# Patient Record
Sex: FEMALE | Race: WHITE | NOT HISPANIC OR LATINO | Employment: OTHER | URBAN - METROPOLITAN AREA
[De-identification: names, ages, dates, MRNs, and addresses within clinical notes are randomized per-mention and may not be internally consistent; named-entity substitution may affect disease eponyms.]

---

## 2017-03-31 ENCOUNTER — TRANSCRIBE ORDERS (OUTPATIENT)
Dept: ADMINISTRATIVE | Facility: HOSPITAL | Age: 57
End: 2017-03-31

## 2017-03-31 DIAGNOSIS — Z12.31 VISIT FOR SCREENING MAMMOGRAM: Primary | ICD-10-CM

## 2017-06-06 ENCOUNTER — APPOINTMENT (OUTPATIENT)
Dept: MAMMOGRAPHY | Facility: HOSPITAL | Age: 57
End: 2017-06-06

## 2017-07-17 ENCOUNTER — HOSPITAL ENCOUNTER (OUTPATIENT)
Dept: MAMMOGRAPHY | Facility: HOSPITAL | Age: 57
Discharge: HOME | End: 2017-07-17
Admitting: PHYSICIAN ASSISTANT

## 2017-07-17 DIAGNOSIS — Z12.31 VISIT FOR SCREENING MAMMOGRAM: ICD-10-CM

## 2017-07-17 PROCEDURE — 77063 BREAST TOMOSYNTHESIS BI: CPT

## 2017-07-17 PROCEDURE — G0202 SCR MAMMO BI INCL CAD: HCPCS

## 2017-07-17 PROCEDURE — 77067 SCR MAMMO BI INCL CAD: CPT | Performed by: RADIOLOGY

## 2017-07-17 PROCEDURE — 77063 BREAST TOMOSYNTHESIS BI: CPT | Performed by: RADIOLOGY

## 2017-07-31 ENCOUNTER — HOSPITAL ENCOUNTER (OUTPATIENT)
Dept: MAMMOGRAPHY | Facility: HOSPITAL | Age: 57
Discharge: HOME | End: 2017-07-31
Admitting: PHYSICIAN ASSISTANT

## 2017-07-31 DIAGNOSIS — R92.8 ABNORMAL MAMMOGRAM: ICD-10-CM

## 2017-07-31 PROCEDURE — 77065 DX MAMMO INCL CAD UNI: CPT | Performed by: RADIOLOGY

## 2017-07-31 PROCEDURE — G0279 TOMOSYNTHESIS, MAMMO: HCPCS

## 2017-07-31 PROCEDURE — G0206 DX MAMMO INCL CAD UNI: HCPCS

## 2017-07-31 PROCEDURE — 77061 BREAST TOMOSYNTHESIS UNI: CPT | Performed by: RADIOLOGY

## 2018-06-20 ENCOUNTER — TRANSCRIBE ORDERS (OUTPATIENT)
Dept: ADMINISTRATIVE | Facility: HOSPITAL | Age: 58
End: 2018-06-20

## 2018-06-20 DIAGNOSIS — Z12.31 VISIT FOR SCREENING MAMMOGRAM: Primary | ICD-10-CM

## 2018-07-19 ENCOUNTER — HOSPITAL ENCOUNTER (OUTPATIENT)
Dept: MAMMOGRAPHY | Facility: HOSPITAL | Age: 58
Discharge: HOME | End: 2018-07-19
Admitting: PHYSICIAN ASSISTANT

## 2018-07-19 DIAGNOSIS — Z12.31 VISIT FOR SCREENING MAMMOGRAM: ICD-10-CM

## 2018-07-19 PROCEDURE — 77067 SCR MAMMO BI INCL CAD: CPT | Performed by: RADIOLOGY

## 2018-07-19 PROCEDURE — 77067 SCR MAMMO BI INCL CAD: CPT

## 2018-07-19 PROCEDURE — 77063 BREAST TOMOSYNTHESIS BI: CPT

## 2018-07-19 PROCEDURE — 77063 BREAST TOMOSYNTHESIS BI: CPT | Performed by: RADIOLOGY

## 2019-06-17 ENCOUNTER — TRANSCRIBE ORDERS (OUTPATIENT)
Dept: ADMINISTRATIVE | Facility: HOSPITAL | Age: 59
End: 2019-06-17

## 2019-06-17 DIAGNOSIS — Z12.31 VISIT FOR SCREENING MAMMOGRAM: Primary | ICD-10-CM

## 2019-08-29 ENCOUNTER — APPOINTMENT (OUTPATIENT)
Dept: MAMMOGRAPHY | Facility: HOSPITAL | Age: 59
End: 2019-08-29

## 2019-09-18 ENCOUNTER — APPOINTMENT (OUTPATIENT)
Dept: MAMMOGRAPHY | Facility: HOSPITAL | Age: 59
End: 2019-09-18

## 2019-10-09 ENCOUNTER — HOSPITAL ENCOUNTER (OUTPATIENT)
Dept: MAMMOGRAPHY | Facility: HOSPITAL | Age: 59
Discharge: HOME | End: 2019-10-09
Admitting: PHYSICIAN ASSISTANT

## 2019-10-09 DIAGNOSIS — Z12.31 VISIT FOR SCREENING MAMMOGRAM: ICD-10-CM

## 2019-10-09 PROCEDURE — 77067 SCR MAMMO BI INCL CAD: CPT

## 2019-10-09 PROCEDURE — 77067 SCR MAMMO BI INCL CAD: CPT | Performed by: RADIOLOGY

## 2019-10-09 PROCEDURE — 77063 BREAST TOMOSYNTHESIS BI: CPT | Performed by: RADIOLOGY

## 2019-10-09 PROCEDURE — 77063 BREAST TOMOSYNTHESIS BI: CPT

## 2020-06-04 ENCOUNTER — TRANSCRIBE ORDERS (OUTPATIENT)
Dept: ADMINISTRATIVE | Facility: HOSPITAL | Age: 60
End: 2020-06-04

## 2020-06-04 DIAGNOSIS — Z12.31 VISIT FOR SCREENING MAMMOGRAM: Primary | ICD-10-CM

## 2020-10-12 ENCOUNTER — APPOINTMENT (OUTPATIENT)
Dept: MAMMOGRAPHY | Facility: HOSPITAL | Age: 60
End: 2020-10-12

## 2021-01-05 ENCOUNTER — HOSPITAL ENCOUNTER (OUTPATIENT)
Dept: MAMMOGRAPHY | Facility: HOSPITAL | Age: 61
Discharge: HOME | End: 2021-01-05
Admitting: FAMILY MEDICINE

## 2021-01-05 DIAGNOSIS — Z12.31 VISIT FOR SCREENING MAMMOGRAM: ICD-10-CM

## 2021-01-05 PROCEDURE — 77063 BREAST TOMOSYNTHESIS BI: CPT | Performed by: RADIOLOGY

## 2021-01-05 PROCEDURE — 77067 SCR MAMMO BI INCL CAD: CPT

## 2021-01-05 PROCEDURE — 77067 SCR MAMMO BI INCL CAD: CPT | Performed by: RADIOLOGY

## 2021-01-05 PROCEDURE — 77063 BREAST TOMOSYNTHESIS BI: CPT

## 2021-01-27 ENCOUNTER — IMMUNIZATION (OUTPATIENT)
Dept: VACCINE CLINIC | Facility: HOSPITAL | Age: 61
End: 2021-01-27

## 2021-01-27 PROCEDURE — 0001A: CPT | Performed by: INTERNAL MEDICINE

## 2021-01-27 PROCEDURE — 91300 HC SARSCOV02 VAC 30MCG/0.3ML IM: CPT | Performed by: INTERNAL MEDICINE

## 2021-02-17 ENCOUNTER — IMMUNIZATION (OUTPATIENT)
Dept: VACCINE CLINIC | Facility: HOSPITAL | Age: 61
End: 2021-02-17

## 2021-02-17 PROCEDURE — 0002A: CPT | Performed by: INTERNAL MEDICINE

## 2021-02-17 PROCEDURE — 91300 HC SARSCOV02 VAC 30MCG/0.3ML IM: CPT | Performed by: INTERNAL MEDICINE

## 2021-08-06 ENCOUNTER — TRANSCRIBE ORDERS (OUTPATIENT)
Dept: ADMINISTRATIVE | Facility: HOSPITAL | Age: 61
End: 2021-08-06

## 2021-08-06 DIAGNOSIS — Z12.31 VISIT FOR SCREENING MAMMOGRAM: Primary | ICD-10-CM

## 2021-12-14 ENCOUNTER — OFFICE VISIT (OUTPATIENT)
Dept: ORTHOPEDIC SURGERY | Facility: CLINIC | Age: 61
End: 2021-12-14

## 2021-12-14 VITALS
BODY MASS INDEX: 28.41 KG/M2 | DIASTOLIC BLOOD PRESSURE: 84 MMHG | HEIGHT: 62 IN | HEART RATE: 77 BPM | SYSTOLIC BLOOD PRESSURE: 130 MMHG | WEIGHT: 154.4 LBS

## 2021-12-14 DIAGNOSIS — M79.672 LEFT FOOT PAIN: Primary | ICD-10-CM

## 2021-12-14 DIAGNOSIS — M25.562 LEFT KNEE PAIN, UNSPECIFIED CHRONICITY: ICD-10-CM

## 2021-12-14 DIAGNOSIS — M17.12 PRIMARY OSTEOARTHRITIS OF LEFT KNEE: ICD-10-CM

## 2021-12-14 DIAGNOSIS — S93.602A FOOT SPRAIN, LEFT, INITIAL ENCOUNTER: ICD-10-CM

## 2021-12-14 PROCEDURE — 99204 OFFICE O/P NEW MOD 45 MIN: CPT | Performed by: PHYSICIAN ASSISTANT

## 2021-12-14 PROCEDURE — 20610 DRAIN/INJ JOINT/BURSA W/O US: CPT | Performed by: PHYSICIAN ASSISTANT

## 2021-12-14 RX ORDER — LIRAGLUTIDE 6 MG/ML
INJECTION, SOLUTION SUBCUTANEOUS DAILY
COMMUNITY
Start: 2021-11-18

## 2021-12-14 RX ORDER — CITALOPRAM 20 MG/1
TABLET ORAL
COMMUNITY
Start: 2021-12-01

## 2021-12-14 RX ORDER — ZOLPIDEM TARTRATE 5 MG/1
TABLET ORAL
COMMUNITY
Start: 2021-11-04 | End: 2022-07-13

## 2021-12-14 RX ADMIN — LIDOCAINE HYDROCHLORIDE 4 ML: 10 INJECTION, SOLUTION EPIDURAL; INFILTRATION; INTRACAUDAL; PERINEURAL at 15:23

## 2021-12-14 RX ADMIN — TRIAMCINOLONE ACETONIDE 40 MG: 40 INJECTION, SUSPENSION INTRA-ARTICULAR; INTRAMUSCULAR at 15:23

## 2021-12-14 NOTE — PROGRESS NOTES
[facility] [place] Orthopaedic Surgery Clinic Note        Subjective     Pain of the Left Knee and Pain of the Left Foot      HPI    Marcia Hussein is a 61 y.o. female.  This is a very pleasant patient presenting to discuss her left foot and knee pain since a fall on 2021.  She reports she fell down some stairs and hyperflexed her knee and with plantarflexion of the foot.  She had swelling in the knee and pain in the knee.  She had dorsal foot pain at that time as well.  Both have improved.  The knee is more painful than the foot.  She has pain 6-10 is aching and throbbing.  Popping grinding and stiffness.  Worse with climbing stairs.  She has pain with lying with her knees together.  She is treated with anti-inflammatories.  Here for further evaluation treat recommendations.    Past Medical History:   Diagnosis Date   • Anxiety       Past Surgical History:   Procedure Laterality Date   • ELBOW PROCEDURE Left     orif   • SHOULDER SURGERY Right     froxen shoulder      Family History   Problem Relation Age of Onset   • Breast cancer Sister 40   • No Known Problems Mother    • Diabetes Father    • Hypertension Father    • Ovarian cancer Neg Hx      Social History     Socioeconomic History   • Marital status:    Tobacco Use   • Smoking status: Former Smoker     Types: Cigarettes     Start date:      Quit date:      Years since quittin.9   • Smokeless tobacco: Never Used   Substance and Sexual Activity   • Alcohol use: Yes   • Drug use: Never   • Sexual activity: Defer      Current Outpatient Medications on File Prior to Visit   Medication Sig Dispense Refill   • citalopram (CeleXA) 20 MG tablet      • Saxenda 18 MG/3ML injection pen      • zolpidem (AMBIEN) 5 MG tablet        No current facility-administered medications on file prior to visit.      No Known Allergies       Review of Systems   Constitutional: Negative.    HENT: Negative.    Eyes: Negative.    Respiratory: Negative.   "  Cardiovascular: Negative.    Gastrointestinal: Negative.    Endocrine: Negative.    Genitourinary: Negative.    Musculoskeletal: Positive for arthralgias.   Skin: Negative.    Allergic/Immunologic: Negative.    Neurological: Negative.    Hematological: Negative.    Psychiatric/Behavioral: Negative.         I reviewed the patient's chief complaint, history of present illness, review of systems, past medical history, surgical history, family history, social history, medications and allergy list.        Objective      Physical Exam  /84   Pulse 77   Ht 158.1 cm (62.25\")   Wt 70 kg (154 lb 6.4 oz)   BMI 28.01 kg/m²     Body mass index is 28.01 kg/m².    General  Mental Status - alert  General Appearance - cooperative, well groomed, not in acute distress  Orientation - Oriented X3  Build & Nutrition - well developed and well nourished  Posture - normal posture  Gait - normal       Ortho Exam  Left knee exam: Tender palpation of the medial patellofemoral joint line.  Range of motion 0-1 20 ligament stable valgus varus stress Novastan intact distally.    Left foot exam: Mildly tender over the midfoot.  No swelling warmth or erythema.  No specific Lisfranc tenderness.  Neurovascular intact distally.  Pulses 2+.      Assessment    Assessment:  1. Left foot pain    2. Left knee pain, unspecified chronicity    3. Primary osteoarthritis of left knee    4. Foot sprain, left, initial encounter          Plan:  1. Recommend over-the-counter medication as needed for discomfort  2. Left knee arthritis with flare.  I reviewed today's knee x-rays clinical findings past and current treatment the patient.  X-rays show moderate to severe tricompartmental arthritis with genu varum alignment with bone-on-bone articulation medial compartment.  Large periarticular osteophytes visualized in all compartments.  No acute bony injury or fracture.  I think her pain functional rotations are secondary to a flare from the fall.  We " discussed treatment options including good shoe wear, ice, NSAIDs, low impact exercise, knee/hip strengthening, weight loss and the 4 fold multiplier on the joint, steroid injection, viscosupplementation and eventually knee replacement.  Plan today is cortisone injection into the left knee.  I will see her back in 3 months see how she is progressed or sooner if needed.  3. Left foot pain.  I reviewed today's x-rays clinical findings past and current treatment patient.  X-rays show mild midfoot arthritis with no acute bony findings.  I reassured her there is nothing more worrisome going on.  She despond to make sure there was nothing fractured.  I will see her back as needed.    I discussed with the patient the potential benefits of performing a therapeutic injection of the left knee as well as potential risks including but not limited to infection, swelling, pain, bleeding, bruising, nerve/vessel damage, skin color changes, transient elevation in blood glucose levels, and fat atrophy. After informed consent and verifying correct patient, procedure site, and type of procedure, the area was prepped with Hibiclens, ethyl chloride was used to numb the skin. Via the inferior lateral approach, 4cc of 1% lidocaine and  40mg/ml of Kenalog were injected into the left knee. The patient tolerated the procedure well. There were no complications.     History, diagnosis and treatment plan discussed with Dr. Miles.            Nette Jimenez PA-C  12/16/21  15:09 EST

## 2021-12-14 NOTE — PROGRESS NOTES
Procedure   Large Joint Arthrocentesis: L knee  Date/Time: 12/14/2021 3:23 PM  Consent given by: patient  Site marked: site marked  Timeout: Immediately prior to procedure a time out was called to verify the correct patient, procedure, equipment, support staff and site/side marked as required   Supporting Documentation  Indications: pain   Procedure Details  Location: knee - L knee  Preparation: Patient was prepped and draped in the usual sterile fashion  Needle size: 22 G  Approach: anterolateral  Medications administered: 4 mL lidocaine PF 1% 1 %; 40 mg triamcinolone acetonide 40 MG/ML  Patient tolerance: patient tolerated the procedure well with no immediate complications

## 2021-12-16 RX ORDER — LIDOCAINE HYDROCHLORIDE 10 MG/ML
4 INJECTION, SOLUTION EPIDURAL; INFILTRATION; INTRACAUDAL; PERINEURAL
Status: COMPLETED | OUTPATIENT
Start: 2021-12-14 | End: 2021-12-14

## 2021-12-16 RX ORDER — TRIAMCINOLONE ACETONIDE 40 MG/ML
40 INJECTION, SUSPENSION INTRA-ARTICULAR; INTRAMUSCULAR
Status: COMPLETED | OUTPATIENT
Start: 2021-12-14 | End: 2021-12-14

## 2021-12-23 ENCOUNTER — TELEPHONE (OUTPATIENT)
Dept: ORTHOPEDIC SURGERY | Facility: CLINIC | Age: 61
End: 2021-12-23

## 2021-12-23 RX ORDER — IBUPROFEN 600 MG/1
600 TABLET ORAL EVERY 6 HOURS PRN
Qty: 30 TABLET | Refills: 0 | Status: SHIPPED | OUTPATIENT
Start: 2021-12-23

## 2021-12-23 NOTE — TELEPHONE ENCOUNTER
From Tatum:   Let patient know medication was placed any further refills will need to come through PCP or she can get over-the-counter.     I spoke with the patient and gave her the above information. She had no further questions.    Esperanza Jimenez

## 2021-12-23 NOTE — TELEPHONE ENCOUNTER
PATIENT CALLED STATING THAT AT LAST VISIT PROVIDER TALKED ABOUT CALLING IN ADVIL, PATIENT WOULD LIKE RX CALLED INTO PHARMACY ON FILE.

## 2021-12-27 ENCOUNTER — TELEPHONE (OUTPATIENT)
Dept: ORTHOPEDIC SURGERY | Facility: CLINIC | Age: 61
End: 2021-12-27

## 2021-12-27 RX ORDER — IBUPROFEN 600 MG/1
600 TABLET ORAL EVERY 8 HOURS PRN
Qty: 30 TABLET | Refills: 1 | Status: SHIPPED | OUTPATIENT
Start: 2021-12-27 | End: 2023-03-14

## 2021-12-27 NOTE — TELEPHONE ENCOUNTER
Advil is an over-the-counter medication.  If she would like prescription strength of ibuprofen then I can put this in for her.    Message text      I called and let her know and she does want a prescription.  Could you put that in please?  Thanks.  Anjana

## 2021-12-27 NOTE — TELEPHONE ENCOUNTER
Patient would like to up the dosage on her advil so she does not need to take it three times a day. Says she requested last week would like a call to be notified if dosage could be increased.

## 2021-12-27 NOTE — TELEPHONE ENCOUNTER
Pt called back to state that [facility] said they did not receive the RX request.    I called [facility] just to confirm and no RX was received. Pharmacy stated to make sure we are sending the RX to [address] [place] [address]

## 2021-12-27 NOTE — TELEPHONE ENCOUNTER
Nette Jimenez PA-C ordered 600 mg ibuprofen last week on 12/23/2021.  Further refills will need to go through her PCP after she picks up this prescription.    Message text      I called and let her know.  Her prescription was sent to the wrong pharmacy.  She is going to she if she can get it straighten out if not she will call back.  Anjana

## 2022-01-07 ENCOUNTER — HOSPITAL ENCOUNTER (OUTPATIENT)
Dept: MAMMOGRAPHY | Facility: HOSPITAL | Age: 62
Discharge: HOME | End: 2022-01-07
Admitting: FAMILY MEDICINE

## 2022-01-07 DIAGNOSIS — Z12.31 VISIT FOR SCREENING MAMMOGRAM: ICD-10-CM

## 2022-01-07 PROCEDURE — 77063 BREAST TOMOSYNTHESIS BI: CPT | Performed by: RADIOLOGY

## 2022-01-07 PROCEDURE — 77063 BREAST TOMOSYNTHESIS BI: CPT

## 2022-01-07 PROCEDURE — 77067 SCR MAMMO BI INCL CAD: CPT

## 2022-01-07 PROCEDURE — 77067 SCR MAMMO BI INCL CAD: CPT | Performed by: RADIOLOGY

## 2022-03-15 ENCOUNTER — OFFICE VISIT (OUTPATIENT)
Dept: ORTHOPEDIC SURGERY | Facility: CLINIC | Age: 62
End: 2022-03-15

## 2022-03-15 VITALS — SYSTOLIC BLOOD PRESSURE: 132 MMHG | HEIGHT: 62 IN | DIASTOLIC BLOOD PRESSURE: 92 MMHG | BODY MASS INDEX: 28.34 KG/M2

## 2022-03-15 DIAGNOSIS — M17.12 PRIMARY OSTEOARTHRITIS OF LEFT KNEE: Primary | ICD-10-CM

## 2022-03-15 PROCEDURE — 99213 OFFICE O/P EST LOW 20 MIN: CPT | Performed by: PHYSICIAN ASSISTANT

## 2022-03-15 NOTE — PROGRESS NOTES
"        [facility] – [place] [place] Orthopaedic Surgery Clinic Note        Subjective     CC: Follow-up (3 month f/u--Primary osteoarthritis of left knee )      HPI    Marcia Hussein is a 61 y.o. female. Patient returns today for her left knee.  She reports signficantly improved pain with injection.  She is having occasional twinges, but overall doing much better.  She does not feel she needs any further treatment     Overall, patient's symptoms are improved    ROS:    Constiutional:Pt denies fever, chills, nausea, or vomiting.  MSK:as above        Objective      Past Medical History  Past Medical History:   Diagnosis Date   • Anxiety          Physical Exam  /92   Ht 157.5 cm (62.01\")   BMI 28.34 kg/m²     Body mass index is 28.34 kg/m².    Patient is well nourished and well developed.        Ortho Exam  Left knee exam: nontender to palpation.  ROM 0-130.  Ligaments stable to valgus and varus stress.  Neurovascular intact distally.  Normal gait.    Imaging/Labs/EMG Reviewed:  Imaging Results (Last 24 Hours)     ** No results found for the last 24 hours. **            Assessment    Assessment:  1. Primary osteoarthritis of left knee        Plan:  1. Recommend over the counter anti-inflammatories for pain and/or swelling  2. Left knee arthritis with improved pain with cortisone injection.  Patient does not feel she needs any further treatment at this time.  We discussed further treatment including repeat cortisone injection versus viscosupplementation in the future.  At this point, she will continue with her normal activity and return to see me as needed.      Nette Jimenez PA-C  03/16/22  13:18 EDT      "

## 2022-04-05 ENCOUNTER — TELEPHONE (OUTPATIENT)
Dept: ORTHOPEDIC SURGERY | Facility: CLINIC | Age: 62
End: 2022-04-05

## 2022-04-05 NOTE — TELEPHONE ENCOUNTER
Caller: REZA MAXWELL    Relationship to patient: SELF    Best call back number:      Type of visit: CORTISONE INJECTION    Requested date: ASAP    If rescheduling, when is the original appointment: 4/14/22

## 2022-04-12 ENCOUNTER — OFFICE VISIT (OUTPATIENT)
Dept: ORTHOPEDIC SURGERY | Facility: CLINIC | Age: 62
End: 2022-04-12

## 2022-04-12 VITALS
HEIGHT: 62 IN | DIASTOLIC BLOOD PRESSURE: 86 MMHG | BODY MASS INDEX: 30 KG/M2 | WEIGHT: 163 LBS | SYSTOLIC BLOOD PRESSURE: 122 MMHG

## 2022-04-12 DIAGNOSIS — M17.12 PRIMARY OSTEOARTHRITIS OF LEFT KNEE: Primary | ICD-10-CM

## 2022-04-12 PROCEDURE — 20610 DRAIN/INJ JOINT/BURSA W/O US: CPT | Performed by: PHYSICIAN ASSISTANT

## 2022-04-12 RX ADMIN — LIDOCAINE HYDROCHLORIDE 4 ML: 10 INJECTION, SOLUTION EPIDURAL; INFILTRATION; INTRACAUDAL; PERINEURAL at 15:36

## 2022-04-12 RX ADMIN — TRIAMCINOLONE ACETONIDE 40 MG: 40 INJECTION, SUSPENSION INTRA-ARTICULAR; INTRAMUSCULAR at 15:36

## 2022-04-12 NOTE — PROGRESS NOTES
Procedure   - Large Joint Arthrocentesis: L knee on 4/12/2022 3:36 PM  Indications: pain  Details: 22 G needle, anterolateral approach  Medications: 4 mL lidocaine PF 1% 1 %; 40 mg triamcinolone acetonide 40 MG/ML  Outcome: tolerated well, no immediate complications  Procedure, treatment alternatives, risks and benefits explained, specific risks discussed. Consent was given by the patient. Immediately prior to procedure a time out was called to verify the correct patient, procedure, equipment, support staff and site/side marked as required. Patient was prepped and draped in the usual sterile fashion.

## 2022-04-12 NOTE — PROGRESS NOTES
"    [facility] – [place] Orthopaedic Surgery Clinic Note        Subjective     CC: Follow-up of the Left Knee (4 week follow up; Primary osteoarthritis of left knee-last cortisone injection given on 12/14/21)      BROOK Hussein is a 61 y.o. female.  Established patient (new to me) returns requesting corticosteroid injection to her left knee.  Previously seen and treated by Nette Jimenez PA-C with a corticosteroid injection 12/14/2021.  She had a follow-up appointment 3/15/2022 and at that time her knee was doing okay and she did not feel that she needed any further treatment then.  But over the last couple weeks pain in her knee has been slowly increasing.    Pain scale 6/10.  Patient notes grinding and stiffness to the knee.  Pain is worse with climbing stairs and sleeping.  She has been icing and elevating but it is not helping.  No reported numbness or tingling.    Overall, patient's symptoms are worsening.    ROS:    Constiutional:Pt denies fever, chills, nausea, or vomiting.  MSK:as above        Objective      Past Medical History  Past Medical History:   Diagnosis Date   • Anxiety          Physical Exam  /86   Ht 157.5 cm (62.01\")   Wt 73.9 kg (163 lb)   BMI 29.81 kg/m²     Body mass index is 29.81 kg/m².    Patient is well nourished and well developed.        Ortho Exam  Left knee  Skin: Grossly intact without any redness or warmth.  Trace effusion noted.  Tenderness: Positive tenderness noted predominantly in medial joint line and thais-/retropatellar.  Motion: 0-120 degrees with crepitus.  Testing: Varus and valgus stress test negative.  Motor/sensory: Grossly intact L2-S1.      Imaging/Labs/EMG Reviewed:  No new imaging today.      Assessment:  1. Primary osteoarthritis of left knee        Plan:  1. Left knee osteoarthritis worsening pain--offered accepted corticosteroid injection to the left knee.  Injection was given today.  2. Continue with over-the-counter pain medication as needed.  3. She may " follow-up in 4 months for repeat corticosteroid injection.  4. Questions and concerns answered.    After discussing the risks, benefits, indications of injection, the patient gave consent to proceed.  Her left knee was confirmed as the correct joint to be injected with a timeout.  It was then prepped using Hibiclens and injected with a mixture of 4 cc of 1% plain lidocaine and 1 cc of Kenalog (40 mg per mL), without any resistance through the anterior lateral approach, patient in seated position.  Area was cleaned, hemostasis was achieved and a Band-Aid was applied over the injection site.  The patient tolerated procedure well.  I instructed the patient on signs and symptoms of infection.  They should report to the emergency department or return to clinic if any of these develop, for further evaluation and treatment.  Recommended modifying activity for the next 48 hours to include rest, ice, elevation and oral pain medication as needed.        Tatum Schaeffer PA-C  04/15/22  13:31 EDT      Dictated Utilizing Dragon Dictation.

## 2022-04-15 RX ORDER — LIDOCAINE HYDROCHLORIDE 10 MG/ML
4 INJECTION, SOLUTION EPIDURAL; INFILTRATION; INTRACAUDAL; PERINEURAL
Status: COMPLETED | OUTPATIENT
Start: 2022-04-12 | End: 2022-04-12

## 2022-04-15 RX ORDER — TRIAMCINOLONE ACETONIDE 40 MG/ML
40 INJECTION, SUSPENSION INTRA-ARTICULAR; INTRAMUSCULAR
Status: COMPLETED | OUTPATIENT
Start: 2022-04-12 | End: 2022-04-12

## 2022-06-09 ENCOUNTER — TELEPHONE (OUTPATIENT)
Dept: ORTHOPEDIC SURGERY | Facility: CLINIC | Age: 62
End: 2022-06-09

## 2022-06-09 NOTE — TELEPHONE ENCOUNTER
Caller: REZA MAXWELL    Relationship to patient: PATIENT     Best call back number:[phone]    Chief complaint: LEFT KNEE PAIN     Type of visit: INJECTION     Requested date: MID July       Additional notes: LAST INJECTION 04/12/22

## 2022-07-13 ENCOUNTER — OFFICE VISIT (OUTPATIENT)
Dept: ORTHOPEDIC SURGERY | Facility: CLINIC | Age: 62
End: 2022-07-13

## 2022-07-13 VITALS
WEIGHT: 162 LBS | BODY MASS INDEX: 29.81 KG/M2 | HEIGHT: 62 IN | SYSTOLIC BLOOD PRESSURE: 126 MMHG | DIASTOLIC BLOOD PRESSURE: 88 MMHG

## 2022-07-13 DIAGNOSIS — M17.12 PRIMARY OSTEOARTHRITIS OF LEFT KNEE: Primary | ICD-10-CM

## 2022-07-13 PROCEDURE — 20610 DRAIN/INJ JOINT/BURSA W/O US: CPT | Performed by: PHYSICIAN ASSISTANT

## 2022-07-13 RX ORDER — ZOLPIDEM TARTRATE 10 MG/1
0.5 TABLET ORAL NIGHTLY
COMMUNITY
Start: 2022-06-08

## 2022-07-13 RX ORDER — ACYCLOVIR 400 MG/1
TABLET ORAL AS NEEDED
COMMUNITY
Start: 2022-05-10

## 2022-07-13 RX ORDER — LOSARTAN POTASSIUM 25 MG/1
1 TABLET ORAL DAILY
COMMUNITY
Start: 2022-07-05

## 2022-07-13 RX ADMIN — LIDOCAINE HYDROCHLORIDE 4 ML: 10 INJECTION, SOLUTION EPIDURAL; INFILTRATION; INTRACAUDAL; PERINEURAL at 11:50

## 2022-07-13 RX ADMIN — TRIAMCINOLONE ACETONIDE 40 MG: 40 INJECTION, SUSPENSION INTRA-ARTICULAR; INTRAMUSCULAR at 11:50

## 2022-07-13 NOTE — PROGRESS NOTES
"        [facility] [place] [facility]opa[facility] Surgery Clinic Note        Subjective     CC: Follow-up (3 month follow up; Primary osteoarthritis of left knee-last cortisone injection given on 4/12/22)      BROOK Hussein is a 61 y.o. female. Patient returns for her left knee.  She is having worsening pain.  Not interested in surgery.     Overall, patient's symptoms are worsening.    ROS:    Constiutional:Pt denies fever, chills, nausea, or vomiting.  MSK:as above        Objective      Past Medical History  Past Medical History:   Diagnosis Date   • Anxiety          Physical Exam  /88   Ht 157.5 cm (62.01\")   Wt 73.5 kg (162 lb)   BMI 29.62 kg/m²     Body mass index is 29.62 kg/m².    Patient is well nourished and well developed.        Ortho Exam  Left knee exam:  Mildly tender ove rthe medial joint line.  Normal ROM. NVI distally    Imaging/Labs/EMG Reviewed:  Imaging Results (Last 24 Hours)     ** No results found for the last 24 hours. **            Assessment    Assessment:  1. Primary osteoarthritis of left knee        Plan:  1. Recommend over the counter anti-inflammatories for pain and/or swelling  2. Left knee arthritis.  Plan today is steroid injection into the left knee.  RTC prn.    I discussed with the patient the potential benefits of performing a therapeutic injection of the left knee as well as potential risks including but not limited to infection, swelling, pain, bleeding, bruising, nerve/vessel damage, skin color changes, transient elevation in blood glucose levels, and fat atrophy. After informed consent and verifying correct patient, procedure site, and type of procedure, the area was prepped with Hibiclens, ethyl chloride was used to numb the skin. Via the inferior lateral approach, 4cc of 1% lidocaine and  40mg/ml of Kenalog were injected into the left knee. The patient tolerated the procedure well. There were no complications.         Nette Jimenez PA-C  07/18/22  14:23 EDT      "

## 2022-07-13 NOTE — PROGRESS NOTES
Procedure   - Large Joint Arthrocentesis: L knee on 7/13/2022 11:50 AM  Indications: pain  Details: 22 G needle, anterolateral approach  Medications: 4 mL lidocaine PF 1% 1 %; 40 mg triamcinolone acetonide 40 MG/ML  Outcome: tolerated well, no immediate complications  Procedure, treatment alternatives, risks and benefits explained, specific risks discussed. Consent was given by the patient. Immediately prior to procedure a time out was called to verify the correct patient, procedure, equipment, support staff and site/side marked as required. Patient was prepped and draped in the usual sterile fashion.

## 2022-07-18 RX ORDER — LIDOCAINE HYDROCHLORIDE 10 MG/ML
4 INJECTION, SOLUTION EPIDURAL; INFILTRATION; INTRACAUDAL; PERINEURAL
Status: COMPLETED | OUTPATIENT
Start: 2022-07-13 | End: 2022-07-13

## 2022-07-18 RX ORDER — TRIAMCINOLONE ACETONIDE 40 MG/ML
40 INJECTION, SUSPENSION INTRA-ARTICULAR; INTRAMUSCULAR
Status: COMPLETED | OUTPATIENT
Start: 2022-07-13 | End: 2022-07-13

## 2022-09-16 ENCOUNTER — TELEPHONE (OUTPATIENT)
Dept: ORTHOPEDIC SURGERY | Facility: CLINIC | Age: 62
End: 2022-09-16

## 2022-09-16 NOTE — TELEPHONE ENCOUNTER
Caller: REZA MAXWELL    Relationship to patient: SELF    Best call back number: [phone]    Chief complaint: LEFT KNEE PAIN    Type of visit: STEROID INJ    Requested date: AS SOON AS RAFITA

## 2022-10-12 ENCOUNTER — OFFICE VISIT (OUTPATIENT)
Dept: ORTHOPEDIC SURGERY | Facility: CLINIC | Age: 62
End: 2022-10-12

## 2022-10-12 VITALS
HEIGHT: 62 IN | WEIGHT: 156 LBS | SYSTOLIC BLOOD PRESSURE: 126 MMHG | BODY MASS INDEX: 28.71 KG/M2 | DIASTOLIC BLOOD PRESSURE: 88 MMHG

## 2022-10-12 DIAGNOSIS — M17.12 PRIMARY OSTEOARTHRITIS OF LEFT KNEE: Primary | ICD-10-CM

## 2022-10-12 PROCEDURE — 20610 DRAIN/INJ JOINT/BURSA W/O US: CPT | Performed by: PHYSICIAN ASSISTANT

## 2022-10-12 RX ADMIN — LIDOCAINE HYDROCHLORIDE 4 ML: 10 INJECTION, SOLUTION EPIDURAL; INFILTRATION; INTRACAUDAL; PERINEURAL at 16:28

## 2022-10-12 RX ADMIN — TRIAMCINOLONE ACETONIDE 40 MG: 40 INJECTION, SUSPENSION INTRA-ARTICULAR; INTRAMUSCULAR at 16:28

## 2022-10-12 NOTE — PROGRESS NOTES
"        [facility] – [place] Orthopaedic Surgery Clinic Note        Subjective     CC: Follow-up of the Left Knee (Last seen on 7/13/22 for Primary osteoarthritis of left knee-cortisone injection given at that time )      HPI    Marcia Hussein is a 61 y.o. female. Patient returns for her left knee.  She would like repeat injection today. Not interested in surgery     Overall, patient's symptoms are the same    ROS:    Constiutional:Pt denies fever, chills, nausea, or vomiting.  MSK:as above        Objective      Past Medical History  Past Medical History:   Diagnosis Date   • Anxiety    • Knee swelling Dec 2021   • Periarthritis of shoulder not noted         Physical Exam  /88   Ht 157.5 cm (62.01\")   Wt 70.8 kg (156 lb)   BMI 28.53 kg/m²     Body mass index is 28.53 kg/m².    Patient is well nourished and well developed.        Ortho Exam  Left knee exam:  ROM 0-120. Ligaments stable NVI distally    Imaging/Labs/EMG Reviewed:  Imaging Results (Last 24 Hours)     ** No results found for the last 24 hours. **            Assessment    Assessment:  1. Primary osteoarthritis of left knee        Plan:  1. Recommend over the counter anti-inflammatories for pain and/or swelling  2. Left knee arthritis.  Plan today is repeat steroid injection into the left knee.  RTC prn.      I discussed with the patient the potential benefits of performing a therapeutic injection of the left knee as well as potential risks including but not limited to infection, swelling, pain, bleeding, bruising, nerve/vessel damage, skin color changes, transient elevation in blood glucose levels, and fat atrophy. After informed consent and verifying correct patient, procedure site, and type of procedure, the area was prepped with Hibiclens, ethyl chloride was used to numb the skin. Via the inferior lateral approach, 4cc of 1% lidocaine and  40mg/ml of Kenalog were injected into the left knee. The patient tolerated the procedure well. There were no complications. "         Nette Jimenez PA-C  10/14/22  14:21 EDT

## 2022-10-12 NOTE — PROGRESS NOTES
Procedure   - Large Joint Arthrocentesis: L knee on 10/12/2022 4:28 PM  Indications: pain  Details: 22 G needle, anterolateral approach  Medications: 4 mL lidocaine PF 1% 1 %; 40 mg triamcinolone acetonide 40 MG/ML  Outcome: tolerated well, no immediate complications  Procedure, treatment alternatives, risks and benefits explained, specific risks discussed. Consent was given by the patient. Immediately prior to procedure a time out was called to verify the correct patient, procedure, equipment, support staff and site/side marked as required. Patient was prepped and draped in the usual sterile fashion.

## 2022-10-14 RX ORDER — TRIAMCINOLONE ACETONIDE 40 MG/ML
40 INJECTION, SUSPENSION INTRA-ARTICULAR; INTRAMUSCULAR
Status: COMPLETED | OUTPATIENT
Start: 2022-10-12 | End: 2022-10-12

## 2022-10-14 RX ORDER — LIDOCAINE HYDROCHLORIDE 10 MG/ML
4 INJECTION, SOLUTION EPIDURAL; INFILTRATION; INTRACAUDAL; PERINEURAL
Status: COMPLETED | OUTPATIENT
Start: 2022-10-12 | End: 2022-10-12

## 2022-12-28 ENCOUNTER — TELEPHONE (OUTPATIENT)
Dept: ORTHOPEDIC SURGERY | Facility: CLINIC | Age: 62
End: 2022-12-28

## 2022-12-28 NOTE — TELEPHONE ENCOUNTER
Caller: REZA MAXWELL    Relationship to patient: SELF    Best call back number: [phone]    Chief complaint: LEFT KNEE PAIN    Type of visit: NORAH INJ    Requested date: ???         Additional notes:PATIENT STATES SHE  WILL BE OUT OF TOWN CLOSE TO DATE OF ELIGIBILITY FOR NEXT INJ

## 2022-12-30 ENCOUNTER — TRANSCRIBE ORDERS (OUTPATIENT)
Dept: ADMINISTRATIVE | Facility: HOSPITAL | Age: 62
End: 2022-12-30
Payer: COMMERCIAL

## 2022-12-30 DIAGNOSIS — Z12.31 VISIT FOR SCREENING MAMMOGRAM: Primary | ICD-10-CM

## 2023-01-25 ENCOUNTER — OFFICE VISIT (OUTPATIENT)
Dept: ORTHOPEDIC SURGERY | Facility: CLINIC | Age: 63
End: 2023-01-25
Payer: COMMERCIAL

## 2023-01-25 VITALS — BODY MASS INDEX: 29.81 KG/M2 | HEIGHT: 62 IN | WEIGHT: 162 LBS

## 2023-01-25 DIAGNOSIS — M17.12 PRIMARY OSTEOARTHRITIS OF LEFT KNEE: Primary | ICD-10-CM

## 2023-01-25 PROCEDURE — 20610 DRAIN/INJ JOINT/BURSA W/O US: CPT | Performed by: PHYSICIAN ASSISTANT

## 2023-01-25 RX ADMIN — LIDOCAINE HYDROCHLORIDE 4 ML: 10 INJECTION, SOLUTION EPIDURAL; INFILTRATION; INTRACAUDAL; PERINEURAL at 16:12

## 2023-01-25 RX ADMIN — TRIAMCINOLONE ACETONIDE 40 MG: 40 INJECTION, SUSPENSION INTRA-ARTICULAR; INTRAMUSCULAR at 16:12

## 2023-01-25 NOTE — PROGRESS NOTES
Procedure   Large Joint Arthrocentesis: L knee  Date/Time: 1/25/2023 4:12 PM  Consent given by: patient  Site marked: site marked  Timeout: Immediately prior to procedure a time out was called to verify the correct patient, procedure, equipment, support staff and site/side marked as required   Supporting Documentation  Indications: pain   Procedure Details  Location: knee - L knee  Preparation: Patient was prepped and draped in the usual sterile fashion  Needle size: 23 G  Approach: anterolateral  Medications administered: 4 mL lidocaine PF 1% 1 %; 40 mg triamcinolone acetonide 40 MG/ML  Patient tolerance: patient tolerated the procedure well with no immediate complications

## 2023-01-25 NOTE — PROGRESS NOTES
"        [facility] [place] Orthopaedic Surgery Clinic Note        Subjective     CC: Follow-up (3 months- Primary osteoarthritis of left knee)      HPI    Marcia Hussein is a 62 y.o. female.  Patient returns today for her left knee arthritis.  She has done well with intermittent cortisone injection.  She would like to have repeat injection.  She has failed Visco in the past.  Not ready to consider surgery    Overall, patient's symptoms are the same    ROS:    Constiutional:Pt denies fever, chills, nausea, or vomiting.  MSK:as above        Objective      Past Medical History  Past Medical History:   Diagnosis Date   • Anxiety    • Knee swelling Dec 2021   • Periarthritis of shoulder not noted         Physical Exam  Ht 157.5 cm (62.01\")   Wt 73.5 kg (162 lb)   BMI 29.62 kg/m²     Body mass index is 29.62 kg/m².    Patient is well nourished and well developed.        Ortho Exam  Left knee exam: Tender over the medial joint line range of motion 0-1 20 ligament stable to valgus varus stress neurovascular tact distally    Imaging/Labs/EMG Reviewed:  Imaging Results (Last 24 Hours)     ** No results found for the last 24 hours. **            Assessment    Assessment:  1. Primary osteoarthritis of left knee        Plan:  1. Recommend over the counter anti-inflammatories for pain and/or swelling  2. Left knee arthritis.  Plan today is repeat cortisone injections left knee.  She will return to have her shoulder evaluated.    I discussed with the patient the potential benefits of performing a therapeutic injection of the left knee as well as potential risks including but not limited to infection, swelling, pain, bleeding, bruising, nerve/vessel damage, skin color changes, transient elevation in blood glucose levels, and fat atrophy. After informed consent and verifying correct patient, procedure site, and type of procedure, the area was prepped with Hibiclens, ethyl chloride was used to numb the skin. Via the inferior lateral approach, 4cc " of 1% lidocaine and  40mg/ml of Kenalog were injected into the left knee. The patient tolerated the procedure well. There were no complications.         Nette Jimenez PA-C  01/30/23  14:28 EST

## 2023-01-27 ENCOUNTER — HOSPITAL ENCOUNTER (OUTPATIENT)
Dept: MAMMOGRAPHY | Facility: HOSPITAL | Age: 63
Discharge: HOME | End: 2023-01-27
Admitting: INTERNAL MEDICINE
Payer: COMMERCIAL

## 2023-01-27 DIAGNOSIS — Z12.31 VISIT FOR SCREENING MAMMOGRAM: ICD-10-CM

## 2023-01-27 PROCEDURE — 77063 BREAST TOMOSYNTHESIS BI: CPT

## 2023-01-27 PROCEDURE — 77067 SCR MAMMO BI INCL CAD: CPT

## 2023-01-27 PROCEDURE — 77063 BREAST TOMOSYNTHESIS BI: CPT | Performed by: RADIOLOGY

## 2023-01-27 PROCEDURE — 77067 SCR MAMMO BI INCL CAD: CPT | Performed by: RADIOLOGY

## 2023-01-30 RX ORDER — LIDOCAINE HYDROCHLORIDE 10 MG/ML
4 INJECTION, SOLUTION EPIDURAL; INFILTRATION; INTRACAUDAL; PERINEURAL
Status: COMPLETED | OUTPATIENT
Start: 2023-01-25 | End: 2023-01-25

## 2023-01-30 RX ORDER — TRIAMCINOLONE ACETONIDE 40 MG/ML
40 INJECTION, SUSPENSION INTRA-ARTICULAR; INTRAMUSCULAR
Status: COMPLETED | OUTPATIENT
Start: 2023-01-25 | End: 2023-01-25

## 2023-01-31 ENCOUNTER — OFFICE VISIT (OUTPATIENT)
Dept: ORTHOPEDIC SURGERY | Facility: CLINIC | Age: 63
End: 2023-01-31
Payer: COMMERCIAL

## 2023-01-31 VITALS
WEIGHT: 162.04 LBS | HEIGHT: 62 IN | SYSTOLIC BLOOD PRESSURE: 128 MMHG | BODY MASS INDEX: 29.82 KG/M2 | DIASTOLIC BLOOD PRESSURE: 88 MMHG

## 2023-01-31 DIAGNOSIS — M25.512 LEFT SHOULDER PAIN, UNSPECIFIED CHRONICITY: Primary | ICD-10-CM

## 2023-01-31 DIAGNOSIS — M75.82 ROTATOR CUFF TENDONITIS, LEFT: ICD-10-CM

## 2023-01-31 PROCEDURE — 99213 OFFICE O/P EST LOW 20 MIN: CPT | Performed by: PHYSICIAN ASSISTANT

## 2023-02-22 ENCOUNTER — TELEPHONE (OUTPATIENT)
Dept: ORTHOPEDIC SURGERY | Facility: CLINIC | Age: 63
End: 2023-02-22

## 2023-02-22 NOTE — TELEPHONE ENCOUNTER
l     Caller: SULLY    Relationship to patient: PT    Patient is needing: PT ORDERS FAXED AS SHE WAS SEEN ON Monday   FAX# [phone]

## 2023-03-14 ENCOUNTER — OFFICE VISIT (OUTPATIENT)
Dept: ORTHOPEDIC SURGERY | Facility: CLINIC | Age: 63
End: 2023-03-14
Payer: COMMERCIAL

## 2023-03-14 VITALS
HEIGHT: 62 IN | SYSTOLIC BLOOD PRESSURE: 122 MMHG | BODY MASS INDEX: 28.89 KG/M2 | DIASTOLIC BLOOD PRESSURE: 84 MMHG | WEIGHT: 157 LBS

## 2023-03-14 DIAGNOSIS — M75.82 ROTATOR CUFF TENDONITIS, LEFT: Primary | ICD-10-CM

## 2023-03-14 DIAGNOSIS — M25.512 LEFT SHOULDER PAIN, UNSPECIFIED CHRONICITY: ICD-10-CM

## 2023-03-14 PROCEDURE — 20610 DRAIN/INJ JOINT/BURSA W/O US: CPT

## 2023-03-14 PROCEDURE — 99213 OFFICE O/P EST LOW 20 MIN: CPT

## 2023-03-14 RX ORDER — MELOXICAM 7.5 MG/1
TABLET ORAL
Qty: 30 TABLET | Refills: 1 | Status: SHIPPED | OUTPATIENT
Start: 2023-03-14

## 2023-03-14 RX ORDER — TRIAMCINOLONE ACETONIDE 40 MG/ML
40 INJECTION, SUSPENSION INTRA-ARTICULAR; INTRAMUSCULAR
Status: COMPLETED | OUTPATIENT
Start: 2023-03-14 | End: 2023-03-14

## 2023-03-14 RX ORDER — LIDOCAINE HYDROCHLORIDE 10 MG/ML
5 INJECTION, SOLUTION EPIDURAL; INFILTRATION; INTRACAUDAL; PERINEURAL
Status: COMPLETED | OUTPATIENT
Start: 2023-03-14 | End: 2023-03-14

## 2023-03-14 RX ADMIN — LIDOCAINE HYDROCHLORIDE 5 ML: 10 INJECTION, SOLUTION EPIDURAL; INFILTRATION; INTRACAUDAL; PERINEURAL at 11:07

## 2023-03-14 RX ADMIN — TRIAMCINOLONE ACETONIDE 40 MG: 40 INJECTION, SUSPENSION INTRA-ARTICULAR; INTRAMUSCULAR at 11:07

## 2023-03-14 NOTE — PROGRESS NOTES
Procedure   Large Joint Arthrocentesis: L subacromial bursa  Date/Time: 3/14/2023 11:07 AM  Consent given by: patient  Site marked: site marked  Timeout: Immediately prior to procedure a time out was called to verify the correct patient, procedure, equipment, support staff and site/side marked as required   Supporting Documentation  Indications: pain   Procedure Details  Location: shoulder - L subacromial bursa  Preparation: Patient was prepped and draped in the usual sterile fashion  Needle size: 22 G  Approach: posterior  Medications administered: 5 mL lidocaine PF 1% 1 %; 40 mg triamcinolone acetonide 40 MG/ML  Patient tolerance: patient tolerated the procedure well with no immediate complications

## 2023-03-14 NOTE — PROGRESS NOTES
[facility] [place] [facility] Office Follow Up       Office Follow Up Visit       Patient Name: Marcia Hussein    Chief Complaint:   Chief Complaint   Patient presents with   • Follow-up     6 week f/u-- Rotator cuff tendonitis, left        Referring Physician: No ref. provider found    History of Present Illness:   Marcia Hussein returns to clinic today for follow-up of left shoulder pain.  She had seen Nette on 1/31/2023 for rotator cuff tendinitis.  She was referred to physical therapy at that time.  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  She has been completing physical therapy exercises at home.  She has not noticed a difference in the pain.  She takes ibuprofen usually.      Subjective     Review of Systems   Constitutional: Negative for chills, fever, unexpected weight gain and unexpected weight loss.   HENT: Negative for congestion, postnasal drip and rhinorrhea.    Eyes: Negative for blurred vision.   Respiratory: Negative for shortness of breath.    Cardiovascular: Negative for leg swelling.   Gastrointestinal: Negative for abdominal pain, nausea and vomiting.   Genitourinary: Negative for difficulty urinating.   Musculoskeletal: Positive for arthralgias. Negative for gait problem, joint swelling and myalgias.   Skin: Negative for skin lesions and wound.   Neurological: Negative for dizziness, weakness, light-headedness and numbness.   Hematological: Does not bruise/bleed easily.   Psychiatric/Behavioral: Negative for depressed mood.        I have reviewed and updated the following portions of the patient's history and review of systems: allergies, current medications, past family history, past medical history, past social history, past surgical history and problem list.    Medications:   Current Outpatient Medications:   •  acyclovir (ZOVIRAX) 400 MG tablet, As Needed., Disp: , Rfl:   •  citalopram (CeleXA) 20 MG  "tablet, , Disp: , Rfl:   •  Cyanocobalamin (VITAMIN B-12 IJ), Inject  as directed 1 (One) Time Per Week., Disp: , Rfl:   •  ibuprofen (ADVIL,MOTRIN) 600 MG tablet, Take 1 tablet by mouth Every 6 (Six) Hours As Needed for Mild Pain ., Disp: 30 tablet, Rfl: 0  •  losartan (COZAAR) 25 MG tablet, Take 1 tablet by mouth Daily., Disp: , Rfl:   •  meloxicam (MOBIC) 7.5 MG tablet, 1 Oral Daily with food., Disp: 30 tablet, Rfl: 1  •  Saxenda 18 MG/3ML injection pen, Daily., Disp: , Rfl:   •  zolpidem (AMBIEN) 10 MG tablet, Take 0.5 tablets by mouth Every Night., Disp: , Rfl:     Allergies: No Known Allergies      Objective      Vital Signs:   Vitals:    03/14/23 1011   BP: 122/84   Weight: 71.2 kg (157 lb)   Height: 157.5 cm (62.01\")       Ortho Exam:  General: no acute distress, comfortable  Vitals reviewed in chart    Musculoskeletal Exam    SIDE: Left  Shoulder Exam:    Tenderness: Rotator cuff    Range of motion measurements (degrees)  Forward flexion/Abduction/External rotation at side/ER at 90/IR at 90/IR position  Active: 180/180/60/60  Passive: same    Painful arc of motion: Yes  No evidence of septic joint  Pain with forward flexion and abduction greater: Yes  Impingement test: painful    Rotator Cuff Testing:  Tenderness to palpation at rotator cuff - yes  Rotator cuff testing Anali's test - painful  Rotator cuff testing External rotation - painful  Rotator cuff testing Lag signs - negative  Pain with abduction great than 90 degrees - yes    Long head of the biceps testing:  Villegas's test for biceps - negative  Bicipital groove tenderness to palpation -negative  Speed's test  - negative        Results Review:     XR Shoulder 2+ View Left    Result Date: 1/31/2023   Left shoulder 3 views: Radiographs demonstrate mild to moderate AC joint arthritis with mild glenohumeral joint arthritis.  Glenohumeral joint is concentrically reduced.  No acute bony injury or fracture.          Assessment / Plan      Assessment: "   Diagnoses and all orders for this visit:    1. Rotator cuff tendonitis, left (Primary)  -     meloxicam (MOBIC) 7.5 MG tablet; 1 Oral Daily with food.  Dispense: 30 tablet; Refill: 1    2. Left shoulder pain, unspecified chronicity  -     meloxicam (MOBIC) 7.5 MG tablet; 1 Oral Daily with food.  Dispense: 30 tablet; Refill: 1    Other orders  -     Large Joint Arthrocentesis: L subacromial bursa          Plan:  1. Sent prescription for meloxicam to take as needed for pain and swelling.  2. Continue with physical therapy exercises at home.  We discussed return to formal PT if time allows and summer.  3. Cortisone injection today into left subacromial space.    SHOULDER SUBACROMIAL SPACE INJECTION: Risks and benefits of a shoulder subacromial space injection were discussed and the patient desired to proceed. Verbal consent was obtained. The patient understood the risk of infection, potential skin changes, bump in blood glucose especially with diabetes, nerve injury, possibility of increased pain in the short term, and possible incomplete pain relief.  Using sterile technique, the shoulder subacromial space was injected from a posterior approach with 1mL of 40 mg triamcinolone acetonide 40 MG/ML and 4cc of lidocaine with aspiration prior to injection. The patient tolerated the procedure without difficulty.  CPT CODE 63030 for major joint aspiration/injection      Follow Up:   As needed      Alissa Ruiz PA-C  [facility] [facility]    Dictated using Dragon Speech Recognition.

## 2023-04-17 ENCOUNTER — TELEPHONE (OUTPATIENT)
Dept: ORTHOPEDIC SURGERY | Facility: CLINIC | Age: 63
End: 2023-04-17
Payer: COMMERCIAL

## 2023-04-17 NOTE — TELEPHONE ENCOUNTER
Called and left message to schedule patient with a different provider since Nette Jimenez has left the practice

## 2023-05-12 ENCOUNTER — TELEPHONE (OUTPATIENT)
Dept: ORTHOPEDIC SURGERY | Facility: CLINIC | Age: 63
End: 2023-05-12

## 2023-05-12 NOTE — TELEPHONE ENCOUNTER
Caller: PATIENT    Relationship to patient: SELF     Best call back number: [phone]    Chief complaint: LEFT KNEE PAIN    Type of visit: INJECTION     Requested date: NEXT AVAILABLE APPT     If rescheduling, when is the original appointment: 05.24.23 AT 4:00 PM  ( APPT NEEDS TO BE RESCHEDULED DUE TO ERINN ZAYAS NO LONGER BEING AT THIS OFFICE.)     Additional notes: PATIENT WAS CALLING TO RESCHEDULE HER INJECTION WITH MS. HUGHES FOR THE LEFT KNEE. THANK YOU!

## 2023-06-01 ENCOUNTER — OFFICE VISIT (OUTPATIENT)
Dept: ORTHOPEDIC SURGERY | Facility: CLINIC | Age: 63
End: 2023-06-01
Payer: COMMERCIAL

## 2023-06-01 VITALS
DIASTOLIC BLOOD PRESSURE: 68 MMHG | BODY MASS INDEX: 29.15 KG/M2 | HEIGHT: 62 IN | WEIGHT: 158.4 LBS | SYSTOLIC BLOOD PRESSURE: 128 MMHG

## 2023-06-01 DIAGNOSIS — M25.562 LEFT KNEE PAIN, UNSPECIFIED CHRONICITY: Primary | ICD-10-CM

## 2023-06-01 DIAGNOSIS — M17.12 PRIMARY OSTEOARTHRITIS OF LEFT KNEE: ICD-10-CM

## 2023-06-01 RX ORDER — PHENTERMINE HYDROCHLORIDE 15 MG/1
15 CAPSULE ORAL EVERY MORNING
COMMUNITY

## 2023-06-01 RX ORDER — CYANOCOBALAMIN 1000 UG/ML
INJECTION, SOLUTION INTRAMUSCULAR; SUBCUTANEOUS
COMMUNITY
Start: 2023-04-24 | End: 2023-06-01 | Stop reason: SDUPTHER

## 2023-06-01 RX ORDER — METFORMIN HYDROCHLORIDE 500 MG/1
1 TABLET, EXTENDED RELEASE ORAL DAILY
COMMUNITY
Start: 2023-05-16

## 2023-06-01 RX ORDER — TOPIRAMATE 25 MG/1
TABLET ORAL
COMMUNITY
Start: 2023-05-16

## 2023-06-01 RX ADMIN — LIDOCAINE HYDROCHLORIDE 4 ML: 10 INJECTION, SOLUTION EPIDURAL; INFILTRATION; INTRACAUDAL; PERINEURAL at 14:11

## 2023-06-01 RX ADMIN — TRIAMCINOLONE ACETONIDE 40 MG: 40 INJECTION, SUSPENSION INTRA-ARTICULAR; INTRAMUSCULAR at 14:11

## 2023-06-01 NOTE — PROGRESS NOTES
"    [facility] [place] [facility] Note        Subjective     CC: Follow-up (4 month follow up; Primary osteoarthritis of left knee )      BROOK Hussein is a 62 y.o. female.  Patient returns today reporting increasing pain to her left knee.  She has known osteoarthritis to the left knee.  Last corticosteroid injection 2023.    Pain scale: 6/10.  Associated symptoms grinding, stiffness.  Pain is worse with walking, movement of joint.  Ice and elevating help.  No reported numbness or tingling.    Overall, patient's symptoms are worsening as prior injection is worn off.    ROS:    Constiutional:Pt denies fever, chills, nausea, or vomiting.  MSK:as above        Objective      Past Medical History  Past Medical History:   Diagnosis Date    Anxiety     Knee swelling Dec 2021    Periarthritis of shoulder not noted    Rotator cuff syndrome      Social History     Socioeconomic History    Marital status:    Tobacco Use    Smoking status: Former     Types: Cigarettes     Start date: 1975     Quit date: 1983     Years since quittin.4    Smokeless tobacco: Never   Vaping Use    Vaping Use: Never used   Substance and Sexual Activity    Alcohol use: Yes     Alcohol/week: 5.0 standard drinks     Types: 5 Glasses of wine per week    Drug use: Never    Sexual activity: Not Currently          Physical Exam  /68   Ht 157.5 cm (62\")   Wt 71.8 kg (158 lb 6.4 oz)   BMI 28.97 kg/m²     Body mass index is 28.97 kg/m².    Patient is well nourished and well developed.        Ortho Exam  Left knee  Skin: Intact without any erythema, warmth.  Trace effusion.  Motion: 0-120° with crepitus.  Tenderness: Positive tenderness noted medial and lateral joint lines.  Additionally there is tenderness noted thais-/retropatellar.  Instability: ACL/PCL/MCL/LCL stable and intact on exam.  Patella: Compression/grind positive.  Straight leg raise: Intact.  Motor: Grossly intact Q/HS/TA/GS/EHL/P  Sensory: Grossly " intact DP/SP/S/S/T nerve distributions.       Imaging/Labs/EMG Reviewed:  Obtain new imaging of left knee today as last series was completed in 2021--to further assess progression of arthritis.  Ordered left knee plain films.  Imaging read/interpreted by Dr. Miles.    Indication: Left knee pain     Comparison: Todays xrays were compared to previous xrays from 12/14/2020     IMPRESSION:      Left Knee: Severe tricompartmental osteoarthritis with bone-on-bone articulation in medial and lateral compartments.  Large periarticular osteophytes visualized in all compartments.  No significant changes compared to prior radiographs.      Assessment:  1. Left knee pain, unspecified chronicity    2. Primary osteoarthritis of left knee        Plan:  Left knee pain worsening, due to left knee osteoarthritis.  Reviewed imaging with the patient--no significant change on imaging.  Patient continues to have tricompartmental osteoarthritis with bone-on-bone to medial and lateral compartments.  Patient still uninterested in proceeding with surgical intervention (TKA).  She reports as long as the corticosteroid injections help for 3 to 4 months she wishes to continue with this form of treatment.  Offered and accepted corticosteroid injection.  Injection was given today.  Recommend OTC NSAIDS/pain medication as needed.  Follow up in 4 months for repeat evaluation.  Questions and concerns answered.      After discussing the risks, benefits, indications of injection, the patient gave consent to proceed.  Her left knee was confirmed as the correct joint to be injected with a timeout.  It was then prepped using Hibiclens and injected with a mixture of 4 cc of 1% plain lidocaine and 1 cc of Kenalog (40 mg per mL), without any resistance through the anterior lateral approach, patient in seated position.  Area was cleaned, hemostasis was achieved and a Band-Aid was applied over the injection site.  The patient tolerated procedure well.  I  instructed the patient on signs and symptoms of infection.  They should report to the emergency department or return to clinic if any of these develop, for further evaluation and treatment.  Recommended modifying activity for the next 48 hours to include rest, ice, elevation and oral pain medication as needed.  Patient was observed ambulating normally after the injection.      Tatum Schaeffer PA-C  06/08/23  07:13 EDT      Dictated Utilizing Dragon Dictation.

## 2023-06-01 NOTE — PROGRESS NOTES
Procedure   - Large Joint Arthrocentesis: L knee on 6/1/2023 2:11 PM  Indications: pain  Details: 21 G needle, anterolateral approach  Medications: 4 mL lidocaine PF 1% 1 %; 40 mg triamcinolone acetonide 40 MG/ML  Outcome: tolerated well, no immediate complications  Procedure, treatment alternatives, risks and benefits explained, specific risks discussed. Consent was given by the patient. Immediately prior to procedure a time out was called to verify the correct patient, procedure, equipment, support staff and site/side marked as required. Patient was prepped and draped in the usual sterile fashion.

## 2023-06-08 RX ORDER — LIDOCAINE HYDROCHLORIDE 10 MG/ML
4 INJECTION, SOLUTION EPIDURAL; INFILTRATION; INTRACAUDAL; PERINEURAL
Status: COMPLETED | OUTPATIENT
Start: 2023-06-01 | End: 2023-06-01

## 2023-06-08 RX ORDER — TRIAMCINOLONE ACETONIDE 40 MG/ML
40 INJECTION, SUSPENSION INTRA-ARTICULAR; INTRAMUSCULAR
Status: COMPLETED | OUTPATIENT
Start: 2023-06-01 | End: 2023-06-01

## 2023-07-20 ENCOUNTER — OFFICE VISIT (OUTPATIENT)
Dept: ORTHOPEDIC SURGERY | Facility: CLINIC | Age: 63
End: 2023-07-20
Payer: COMMERCIAL

## 2023-07-20 VITALS
HEIGHT: 62 IN | BODY MASS INDEX: 29 KG/M2 | WEIGHT: 157.6 LBS | SYSTOLIC BLOOD PRESSURE: 122 MMHG | DIASTOLIC BLOOD PRESSURE: 68 MMHG

## 2023-07-20 DIAGNOSIS — M75.82 ROTATOR CUFF TENDONITIS, LEFT: ICD-10-CM

## 2023-07-20 DIAGNOSIS — M25.512 LEFT SHOULDER PAIN, UNSPECIFIED CHRONICITY: Primary | ICD-10-CM

## 2023-07-20 RX ORDER — CYANOCOBALAMIN 1000 UG/ML
INJECTION, SOLUTION INTRAMUSCULAR; SUBCUTANEOUS
COMMUNITY
Start: 2023-07-07

## 2023-07-20 RX ADMIN — TRIAMCINOLONE ACETONIDE 40 MG: 40 INJECTION, SUSPENSION INTRA-ARTICULAR; INTRAMUSCULAR at 11:30

## 2023-07-20 RX ADMIN — LIDOCAINE HYDROCHLORIDE 4 ML: 10 INJECTION, SOLUTION EPIDURAL; INFILTRATION; INTRACAUDAL; PERINEURAL at 11:30

## 2023-07-20 NOTE — PROGRESS NOTES
Procedure   - Large Joint Arthrocentesis: L subacromial bursa on 7/20/2023 11:30 AM  Indications: pain  Details: 21 G needle, posterior approach  Medications: 4 mL lidocaine PF 1% 1 %; 40 mg triamcinolone acetonide 40 MG/ML  Outcome: tolerated well, no immediate complications  Procedure, treatment alternatives, risks and benefits explained, specific risks discussed. Consent was given by the patient. Immediately prior to procedure a time out was called to verify the correct patient, procedure, equipment, support staff and site/side marked as required. Patient was prepped and draped in the usual sterile fashion.

## 2023-07-20 NOTE — PROGRESS NOTES
"    [facility] [place] [facility] Note        Subjective     CC: Follow-up (4 month follow up - left shoulder rotator cuff tendonitis)      HPI    Marcia Hussein is a 62 y.o. female.  Patient returns today requesting repeat corticosteroid injection to left shoulder subacromial space.  Last injection was given 3/14/2023.  She reports that she had improvement of symptoms from that injection but is beginning to wear off.  She is also been doing a home exercise program.    Pain scale: 4/10.  Associated symptoms grinding.  Pain is worse with certain movements of the shoulder.  No reported numbness or tingling into the extremity.     Overall, patient's symptoms are improved.    ROS:    Constiutional:Pt denies fever, chills, nausea, or vomiting.  MSK:as above        Objective      Past Medical History  Past Medical History:   Diagnosis Date    Anxiety     Knee swelling Dec 2021    Periarthritis of shoulder not noted    Rotator cuff syndrome      Social History     Socioeconomic History    Marital status:    Tobacco Use    Smoking status: Former     Types: Cigarettes     Start date: 1975     Quit date: 1983     Years since quittin.5    Smokeless tobacco: Never   Vaping Use    Vaping Use: Never used   Substance and Sexual Activity    Alcohol use: Yes     Alcohol/week: 5.0 standard drinks     Types: 5 Glasses of wine per week    Drug use: Never    Sexual activity: Not Currently          Physical Exam  /68   Ht 158 cm (62.21\")   Wt 71.5 kg (157 lb 9.6 oz)   BMI 28.64 kg/m²     Body mass index is 28.64 kg/m².    Patient is well nourished and well developed.        Ortho Exam  Left shoulder  Skin: Grossly intact without any redness warmth or swelling.  Tenderness: Palpable tenderness noted predominantly in the lateral aspect of the shoulder.  Motion: Full range of motion of the shoulder but pain noted.  Testing: Anali, Mancini, impingement positive.  Deltoid: Intact.  Motor/sensory: Grossly " intact C5-T1.      Imaging/Labs/EMG Reviewed:  No new imaging today.      Assessment:  1. Left shoulder pain, unspecified chronicity    2. Rotator cuff tendonitis, left        Plan:  Left shoulder pain due to rotator cuff tendinitis.  Offered and accepted left shoulder subacromial space corticosteroid injection. Injection given today.  Continue with HEP working on range of motion, stretching and strengthening to the rotator cuff and deltoid muscles.  Recommend OTC NSAIDs/pain medication as needed.  Follow up as needed.  If symptoms persist recommend advanced imaging.  Questions and concerns answered.      After discussing the risks, benefits, indications of injection, the patient gave consent to proceed.  Her left shoulder, subacromial space was confirmed as the correct site to be injected with a timeout.  It was then prepped using Hibiclens and injected with a mixture of 4 cc of 1% plain lidocaine and 1 cc of Kenalog (40 mg per mL), without any resistance through the posterior lateral approach, patient in seated position.  Area was cleaned, hemostasis was achieved and a Band-Aid was applied over the injection site.  The patient tolerated procedure well.  I instructed the patient on signs and symptoms of infection.  They should report to the emergency department or return to clinic if any of these develop, for further evaluation and treatment.  Recommended modifying activity for the next 48 hours to include rest, ice, elevation and oral pain medication as needed.        Tatum Schaeffer PA-C  07/26/23  14:57 EDT      Dictated Utilizing Dragon Dictation.

## 2023-07-26 RX ORDER — TRIAMCINOLONE ACETONIDE 40 MG/ML
40 INJECTION, SUSPENSION INTRA-ARTICULAR; INTRAMUSCULAR
Status: COMPLETED | OUTPATIENT
Start: 2023-07-20 | End: 2023-07-20

## 2023-07-26 RX ORDER — LIDOCAINE HYDROCHLORIDE 10 MG/ML
4 INJECTION, SOLUTION EPIDURAL; INFILTRATION; INTRACAUDAL; PERINEURAL
Status: COMPLETED | OUTPATIENT
Start: 2023-07-20 | End: 2023-07-20

## 2023-10-10 ENCOUNTER — OFFICE VISIT (OUTPATIENT)
Dept: ORTHOPEDIC SURGERY | Facility: CLINIC | Age: 63
End: 2023-10-10
Payer: COMMERCIAL

## 2023-10-10 DIAGNOSIS — M17.12 PRIMARY OSTEOARTHRITIS OF LEFT KNEE: Primary | ICD-10-CM

## 2023-10-10 PROCEDURE — 20610 DRAIN/INJ JOINT/BURSA W/O US: CPT | Performed by: PHYSICIAN ASSISTANT

## 2023-10-10 RX ADMIN — TRIAMCINOLONE ACETONIDE 40 MG: 40 INJECTION, SUSPENSION INTRA-ARTICULAR; INTRAMUSCULAR at 12:51

## 2023-10-10 RX ADMIN — LIDOCAINE HYDROCHLORIDE 4 ML: 10 INJECTION, SOLUTION EPIDURAL; INFILTRATION; INTRACAUDAL; PERINEURAL at 12:51

## 2023-10-10 NOTE — PROGRESS NOTES
[facility] [place] [facility] Note        Subjective     CC: Follow-up (4 month f/u - Primary osteoarthritis of left knee )      HPI    Marcia Hussein is a 62 y.o. female.  Patient here today requesting repeat injection to the left knee.  Patient with known osteoarthritis who has been receiving corticosteroid injection every 3 to 4 months for pain control.  The injections worked well for a few months and then slowly wear off.    Pain scale: 7/10.  Associated symptoms grinding and stiffness taking Tylenol for pain control.  Pain is worse with walking and certain movements of knee.  Resting and heat help.    Overall, patient's symptoms are improved with injections    ROS:    Constiutional:Pt denies fever, chills, nausea, or vomiting.  MSK:as above        Objective      Past Medical History  Past Medical History:   Diagnosis Date    Anxiety     Knee swelling Dec 2021    Periarthritis of shoulder not noted    Rotator cuff syndrome      Social History     Socioeconomic History    Marital status:    Tobacco Use    Smoking status: Former     Types: Cigarettes     Start date: 1975     Quit date: 1983     Years since quittin.8    Smokeless tobacco: Never   Vaping Use    Vaping Use: Never used   Substance and Sexual Activity    Alcohol use: Yes     Alcohol/week: 5.0 standard drinks of alcohol     Types: 5 Glasses of wine per week    Drug use: Never    Sexual activity: Not Currently          Physical Exam  There were no vitals taken for this visit.    There is no height or weight on file to calculate BMI.    Patient is well nourished and well developed.        Ortho Exam  Left knee  Skin: Grossly intact without any redness, warmth or swelling  Motion: 0-120 degrees with crepitus.  Straight leg raise: Intact.  Motor/sensory: Grossly intact L2-S1.      Imaging/Labs/EMG Reviewed:  No new imaging today.      Assessment:  1. Primary osteoarthritis of left knee        Plan:  Left knee  osteoarthritis--offered and accepted corticosteroid injection.  Injection given today.  Patient is now interested in considering proceeding with TKA--sometime May 2024.  When she is ready for TKA she would like Dr. Bradley to perform the surgery.  Recommend OTC NSAIDs/pain medication as needed.  Follow-up in 3 months with Dr. Bradley to schedule TKA if she still wishing for surgical intervention, plus/minus repeat injection depending on when her surgery will be.  Follow up in 4 months for repeat evaluation.  Questions and concerns answered.    After discussing the risks, benefits, indications of injection, the patient gave consent to proceed.  Her left knee was confirmed as the correct joint to be injected with a timeout.  It was then prepped using Hibiclens and injected with a mixture of 4 cc of 1% plain lidocaine and 1 cc of Kenalog (40 mg per mL), without any resistance through the anterior lateral approach, patient in seated position.  Area was cleaned, hemostasis was achieved and a Band-Aid was applied over the injection site.  The patient tolerated procedure well.  I instructed the patient on signs and symptoms of infection.  They should report to the emergency department or return to clinic if any of these develop, for further evaluation and treatment.  Recommended modifying activity for the next 48 hours to include rest, ice, elevation and oral pain medication as needed.  Patient was observed ambulating normally after the injection.      Tatum Schaeffer PA-C  10/10/23  13:11 EDT      Dictated Utilizing Dragon Dictation.

## 2023-10-10 NOTE — PROGRESS NOTES
Procedure   - Large Joint Arthrocentesis: L knee on 10/10/2023 12:51 PM  Indications: pain  Details: 21 G needle, anterolateral approach  Medications: 4 mL lidocaine PF 1% 1 %; 40 mg triamcinolone acetonide 40 MG/ML  Outcome: tolerated well, no immediate complications  Procedure, treatment alternatives, risks and benefits explained, specific risks discussed. Consent was given by the patient. Immediately prior to procedure a time out was called to verify the correct patient, procedure, equipment, support staff and site/side marked as required. Patient was prepped and draped in the usual sterile fashion.

## 2023-10-16 RX ORDER — LIDOCAINE HYDROCHLORIDE 10 MG/ML
4 INJECTION, SOLUTION EPIDURAL; INFILTRATION; INTRACAUDAL; PERINEURAL
Status: COMPLETED | OUTPATIENT
Start: 2023-10-10 | End: 2023-10-10

## 2023-10-16 RX ORDER — TRIAMCINOLONE ACETONIDE 40 MG/ML
40 INJECTION, SUSPENSION INTRA-ARTICULAR; INTRAMUSCULAR
Status: COMPLETED | OUTPATIENT
Start: 2023-10-10 | End: 2023-10-10

## 2024-01-15 ENCOUNTER — OFFICE VISIT (OUTPATIENT)
Dept: ORTHOPEDIC SURGERY | Facility: CLINIC | Age: 64
End: 2024-01-15
Payer: COMMERCIAL

## 2024-01-15 VITALS
BODY MASS INDEX: 31.28 KG/M2 | WEIGHT: 170 LBS | DIASTOLIC BLOOD PRESSURE: 72 MMHG | HEIGHT: 62 IN | SYSTOLIC BLOOD PRESSURE: 124 MMHG

## 2024-01-15 DIAGNOSIS — M17.12 PRIMARY OSTEOARTHRITIS OF LEFT KNEE: Primary | ICD-10-CM

## 2024-01-15 PROBLEM — M17.11 DEGENERATIVE ARTHRITIS OF RIGHT KNEE: Status: ACTIVE | Noted: 2024-01-15

## 2024-01-15 PROCEDURE — 99214 OFFICE O/P EST MOD 30 MIN: CPT | Performed by: ORTHOPAEDIC SURGERY

## 2024-01-15 PROCEDURE — 20610 DRAIN/INJ JOINT/BURSA W/O US: CPT | Performed by: ORTHOPAEDIC SURGERY

## 2024-01-15 RX ORDER — CHLORHEXIDINE GLUCONATE 40 MG/ML
SOLUTION TOPICAL DAILY
Qty: 237 ML | Refills: 0 | Status: SHIPPED | OUTPATIENT
Start: 2024-01-15

## 2024-01-15 RX ORDER — PREGABALIN 150 MG/1
150 CAPSULE ORAL ONCE
OUTPATIENT
Start: 2024-01-15 | End: 2024-01-15

## 2024-01-15 RX ORDER — MELOXICAM 7.5 MG/1
15 TABLET ORAL ONCE
OUTPATIENT
Start: 2024-01-15 | End: 2024-01-15

## 2024-01-15 RX ORDER — ACETAMINOPHEN 325 MG/1
1000 TABLET ORAL ONCE
OUTPATIENT
Start: 2024-01-15 | End: 2024-01-15

## 2024-01-15 RX ORDER — TRIAMCINOLONE ACETONIDE 40 MG/ML
40 INJECTION, SUSPENSION INTRA-ARTICULAR; INTRAMUSCULAR
Status: COMPLETED | OUTPATIENT
Start: 2024-01-15 | End: 2024-01-15

## 2024-01-15 RX ORDER — ROPIVACAINE HYDROCHLORIDE 5 MG/ML
4 INJECTION, SOLUTION EPIDURAL; INFILTRATION; PERINEURAL
Status: COMPLETED | OUTPATIENT
Start: 2024-01-15 | End: 2024-01-15

## 2024-01-15 RX ADMIN — TRIAMCINOLONE ACETONIDE 40 MG: 40 INJECTION, SUSPENSION INTRA-ARTICULAR; INTRAMUSCULAR at 13:01

## 2024-01-15 RX ADMIN — ROPIVACAINE HYDROCHLORIDE 4 ML: 5 INJECTION, SOLUTION EPIDURAL; INFILTRATION; PERINEURAL at 13:01

## 2024-01-15 NOTE — PROGRESS NOTES
Procedure   - Large Joint Arthrocentesis: L knee on 1/15/2024 1:01 PM  Indications: pain  Details: 21 G needle, anterolateral approach  Medications: 40 mg triamcinolone acetonide 40 MG/ML; 4 mL ropivacaine 0.5 %  Outcome: tolerated well, no immediate complications  Procedure, treatment alternatives, risks and benefits explained, specific risks discussed. Consent was given by the patient. Immediately prior to procedure a time out was called to verify the correct patient, procedure, equipment, support staff and site/side marked as required. Patient was prepped and draped in the usual sterile fashion.

## 2024-01-15 NOTE — PROGRESS NOTES
[facility] Note    Subjective     Chief Complaint   Patient presents with    Follow-up     3 month follow up --Primary osteoarthritis of left knee         HPI    It has been 3  month(s) since Ms. Hussein's last visit. She returns to clinic today for follow-up of left knee arthritis. The issue has been ongoing for 2 year(s). She rates her pain a 8/10 on the pain scale. Previous/current treatments: NSAIDS and physical therapy. Current symptoms: grinding, stiffness, and giving way/buckling. The pain is worse with walking, standing, climbing stairs, sleeping, and rising from seated position; nothing improve the pain. Overall, she is doing better.  She has reached a point where she would like to proceed with left total knee arthroplasty surgery in May timeframe.  She would like an injection today to get by until then.  Previous injections have provided brief relief.  However, she has reached a point where she would like to proceed with knee replacement surgery.  No history of clots or clotting disorders.  No blood thinners.  She is prediabetic.  Her daughter is able to help her out postoperatively.    I have reviewed the following portions of the patient's history and agree with: History of Present Illness and Review of Systems    Patient Active Problem List   Diagnosis    Degenerative arthritis of right knee     Past Medical History:   Diagnosis Date    Anxiety     Knee swelling Dec 2021    Periarthritis of shoulder not noted    Rotator cuff syndrome 2022      Past Surgical History:   Procedure Laterality Date    ELBOW PROCEDURE Left 2015    orif    SHOULDER SURGERY Right 2012    froxen shoulder      Family History   Problem Relation Age of Onset    Breast cancer Sister 40    No Known Problems Mother     Diabetes Father     Hypertension Father     Ovarian cancer Neg Hx      Social History     Socioeconomic History    Marital status:    Tobacco Use    Smoking status: Former     Types:  Cigarettes     Start date: 1975     Quit date: 1983     Years since quittin.0    Smokeless tobacco: Never   Vaping Use    Vaping Use: Never used   Substance and Sexual Activity    Alcohol use: Yes     Alcohol/week: 5.0 standard drinks of alcohol     Types: 5 Glasses of wine per week    Drug use: Never    Sexual activity: Not Currently      Current Outpatient Medications on File Prior to Visit   Medication Sig Dispense Refill    acyclovir (ZOVIRAX) 400 MG tablet As Needed.      citalopram (CeleXA) 20 MG tablet       cyanocobalamin 1000 MCG/ML injection       ibuprofen (ADVIL,MOTRIN) 600 MG tablet Take 1 tablet by mouth Every 6 (Six) Hours As Needed for Mild Pain . 30 tablet 0    losartan (COZAAR) 25 MG tablet Take 1 tablet by mouth Daily.      meloxicam (MOBIC) 7.5 MG tablet 1 Oral Daily with food. 30 tablet 1    metFORMIN ER (GLUCOPHAGE-XR) 500 MG 24 hr tablet Take 1 tablet by mouth Daily.      phentermine 15 MG capsule Take 1 capsule by mouth Every Morning.      topiramate (TOPAMAX) 25 MG tablet       zolpidem (AMBIEN) 10 MG tablet Take 0.5 tablets by mouth Every Night.       No current facility-administered medications on file prior to visit.      No Known Allergies     Review of Systems   Constitutional:  Negative for activity change, appetite change, chills, diaphoresis, fatigue, fever and unexpected weight change.   HENT:  Negative for congestion, dental problem, drooling, ear discharge, ear pain, facial swelling, hearing loss, mouth sores, nosebleeds, postnasal drip, rhinorrhea, sinus pressure, sneezing, sore throat, tinnitus, trouble swallowing and voice change.    Eyes:  Negative for photophobia, pain, discharge, redness, itching and visual disturbance.   Respiratory:  Negative for apnea, cough, choking, chest tightness, shortness of breath, wheezing and stridor.    Cardiovascular:  Negative for chest pain, palpitations and leg swelling.   Gastrointestinal:  Negative for abdominal distention,  "abdominal pain, anal bleeding, blood in stool, constipation, diarrhea, nausea, rectal pain and vomiting.   Endocrine: Negative for cold intolerance, heat intolerance, polydipsia, polyphagia and polyuria.   Genitourinary:  Negative for decreased urine volume, difficulty urinating, dysuria, enuresis, flank pain, frequency, genital sores, hematuria and urgency.   Musculoskeletal:  Positive for arthralgias. Negative for back pain, gait problem, joint swelling, myalgias, neck pain and neck stiffness.   Skin:  Negative for color change, pallor, rash and wound.   Allergic/Immunologic: Negative for environmental allergies, food allergies and immunocompromised state.   Neurological:  Negative for dizziness, tremors, seizures, syncope, facial asymmetry, speech difficulty, weakness, light-headedness, numbness and headaches.   Hematological:  Negative for adenopathy. Does not bruise/bleed easily.   Psychiatric/Behavioral:  Negative for agitation, behavioral problems, confusion, decreased concentration, dysphoric mood, hallucinations, self-injury, sleep disturbance and suicidal ideas. The patient is not nervous/anxious and is not hyperactive.         Objective      Physical Exam  /72   Ht 158 cm (62.21\")   Wt 77.1 kg (170 lb)   BMI 30.88 kg/m²     Body mass index is 30.88 kg/m².    General:   Mental Status:  Alert   Appearance: Cooperative, in no acute distress   Build and Nutrition: Well-nourished well-developed female   Orientation: Alert and oriented to person, place and time   Posture: Normal   Gait: Nonantalgic    Integument:   Left knee: No skin lesions, no rash, no ecchymosis    Lower Extremities:   Left Knee:    Tenderness:  Medial/lateral joint line tenderness    Effusion:  1+    Swelling:  None    Crepitus: Positive    Range of motion:  Extension: 15°       Flexion: 95°  Instability: No varus laxity, no valgus laxity, negative anterior drawer  Deformities:  Varus      Imaging/Studies  Imaging Results (Last 24 " Hours)       ** No results found for the last 24 hours. **          No new imaging today.    Assessment and Plan     Diagnoses and all orders for this visit:    1. Primary osteoarthritis of left knee (Primary)  -     - Large Joint Arthrocentesis: L knee  -     Case Request; Standing  -     Instructions on coughing, deep breathing, and incentive spirometry.; Future  -     CBC and Differential; Future  -     Basic metabolic panel; Future  -     Protime-INR; Future  -     APTT; Future  -     Hemoglobin A1c; Future  -     Sedimentation rate; Future  -     C-reactive protein; Future  -     Tranexamic Acid 1,000 mg in sodium chloride 0.9 % 100 mL  -     Tranexamic Acid 1,000 mg in sodium chloride 0.9 % 100 mL  -     ethyl alcohol 62 % 2 each  -     ceFAZolin (ANCEF) 2 g in sodium chloride 0.9 % 100 mL IVPB  -     acetaminophen (TYLENOL) tablet 975 mg  -     meloxicam (MOBIC) tablet 15 mg  -     pregabalin (LYRICA) capsule 150 mg  -     Case Request    Other orders  -     Outpatient In A Bed; Standing  -     Follow Anesthesia Guidelines / Protocol; Future  -     Follow Anesthesia Guidelines / Protocol; Standing  -     Nerve Block; Standing  -     Verify NPO Status; Standing  -     Verify The Time Patient Completed ERAS Hydration Drink; Standing  -     SCD (sequential compression device)- to be placed on patient in Pre-op; Standing  -     POC Glucose Once; Standing  -     Clip operative site; Standing  -     Obtain informed consent (if not collected inpatient or PAT); Standing  -     Obtain informed consent  -     Provide instructions to patient regarding NPO status  -     Chlorhexidine Skin Prep - Educate and Review With Patient; Future  -     Provide Patient With ERAS Hydration Instructions  -     Provide Patient With Enhanced Recovery Booklet(s) or Handout  -     Provide Instructions/Handout For Benzoyl Peroxide 5% Wash If Having Shoulder/Arm Surgery (If Prescribed)  -     Provide Instructions/Handout For Bactroban And  Chlorhexidine Shower (If Prescribed)  -     Perform A Memory Screening On All Hip/Knee Replacement Patients >Or Equal To 65 Years Or Older  -     Complete A PROMIS And HOOS Or KOOS Survey If Having Hip Or Knee Replacement  -     Provide Patient With Carbo Loading Instructions  -     Provide Patient With ERAS Booklet(s)/Handout  -     Chlorhexidine Gluconate 4 % solution; Apply topically to the appropriate area as directed Daily 5 days prior to surgery.  Dispense: 237 mL; Refill: 0        1. Primary osteoarthritis of left knee        Reviewed my findings with the patient.  Her left knee has progressed to the point where she would like to proceed with knee replacement surgery later this year in May timeframe.  She would like an injection today to get by until her time of surgery.  Risks, benefits, and alternatives been discussed.  Please see my counseling note for details.    Procedure Note:  The potential benefits of performing a therapeutic left knee joint injection, as well as potential risks (including, but not limited to infection, swelling, pain, bleeding, bruising, nerve/blood vessel damage, skin color changes, transient elevation in blood glucose levels, and fat atrophy) were discussed with the patient.  After informed consent, timeout procedure was performed, and the skin on the left knee was prepped with chlorhexidine soap and alcohol, after which ethyl chloride was applied to the skin at the injection site. Via the anterolateral approach, 1ml of Kenalog 40mg/ml mixed with 4ml 0.5% ropivacaine plain was injected into the knee joint.  The patient tolerated the procedure well, experiencing 60% improvement a few minutes following the injection. There were no complications.  Band-Aid was applied to the injection site. Post-procedural instructions were given to the patient and/or their caregiver.      Surgical Counseling     I have informed the patient of the diagnosis and the prognosis.  Exhaustive conservative  treatment modalities have not resulted in long term pain relief.  The symptoms have progressed to the point of daily pain and inability to perform activities of daily living without significant pain. The patient has reached the point of desiring to proceed with total knee arthroplasty after discussing the risks, benefits and alternatives to the procedure.  The surgical procedure itself was discussed in detail. Risks of the procedure were discussed, which included but are not limited to, bleeding, infection, damage to blood vessels and nerves, incomplete pain relief, loosening of the prosthesis (early or late), deep infection (early or late), need for further surgery, loss of limb, deep venous thrombosis, pulmonary embolus, death, heart attack, stroke, kidney failure, liver failure, and anesthetic complications.  In addition, the potential for deep infection developing in the future was discussed, which could require further surgery.  The knee would have to be re-opened, debrided, and potentially remove the prosthesis, which may or may not be replaced in the future.  Also, the possibility for loosening of the prosthesis has been mentioned.  If the prosthesis loosened, a revision arthroplasty could be performed, with results that are not as predictable compared to the original procedure.  The typical rehabilitative course has also been discussed, and full recovery may take up to a year to see the maximum benefit.  The importance of patient cooperation in the rehabilitative efforts has also been discussed.  No guarantees were given.  The patient understands the potential risks versus the benefits and desires to proceed with total knee arthroplasty at a mutually convenient time.     Return for surgery.      Blake Bradley MD  01/15/24  13:18 EST

## 2024-01-16 ENCOUNTER — TRANSCRIBE ORDERS (OUTPATIENT)
Dept: ADMINISTRATIVE | Facility: HOSPITAL | Age: 64
End: 2024-01-16
Payer: COMMERCIAL

## 2024-01-16 DIAGNOSIS — Z12.31 ENCOUNTER FOR SCREENING MAMMOGRAM FOR MALIGNANT NEOPLASM OF BREAST: Primary | ICD-10-CM

## 2024-02-21 ENCOUNTER — HOSPITAL ENCOUNTER (OUTPATIENT)
Dept: MAMMOGRAPHY | Facility: HOSPITAL | Age: 64
Discharge: HOME | End: 2024-02-21
Admitting: INTERNAL MEDICINE
Payer: COMMERCIAL

## 2024-02-21 DIAGNOSIS — Z12.31 ENCOUNTER FOR SCREENING MAMMOGRAM FOR MALIGNANT NEOPLASM OF BREAST: ICD-10-CM

## 2024-02-21 PROCEDURE — 77063 BREAST TOMOSYNTHESIS BI: CPT

## 2024-02-21 PROCEDURE — 77067 SCR MAMMO BI INCL CAD: CPT

## 2024-03-07 ENCOUNTER — TELEPHONE (OUTPATIENT)
Dept: ORTHOPEDIC SURGERY | Facility: CLINIC | Age: 64
End: 2024-03-07
Payer: COMMERCIAL

## 2024-03-07 NOTE — TELEPHONE ENCOUNTER
PT called and stated she is needing to reschedule her surgery appt with Dr nicole.    Please advise, thank you.

## 2024-05-07 ENCOUNTER — TELEPHONE (OUTPATIENT)
Dept: ORTHOPEDIC SURGERY | Facility: CLINIC | Age: 64
End: 2024-05-07

## 2024-05-07 ENCOUNTER — PRE-ADMISSION TESTING (OUTPATIENT)
Dept: PREADMISSION TESTING | Facility: HOSPITAL | Age: 64
End: 2024-05-07
Payer: COMMERCIAL

## 2024-05-07 VITALS — WEIGHT: 167.22 LBS | HEIGHT: 62 IN | BODY MASS INDEX: 30.77 KG/M2

## 2024-05-07 DIAGNOSIS — M17.12 PRIMARY OSTEOARTHRITIS OF LEFT KNEE: ICD-10-CM

## 2024-05-07 DIAGNOSIS — M17.12 PRIMARY OSTEOARTHRITIS OF LEFT KNEE: Primary | ICD-10-CM

## 2024-05-07 LAB
ANION GAP SERPL CALCULATED.3IONS-SCNC: 11 MMOL/L (ref 5–15)
APTT PPP: 25.7 SECONDS (ref 22–39)
BASOPHILS # BLD AUTO: 0.04 10*3/MM3 (ref 0–0.2)
BASOPHILS NFR BLD AUTO: 0.6 % (ref 0–1.5)
BUN SERPL-MCNC: 10 MG/DL (ref 8–23)
BUN/CREAT SERPL: 17.2 (ref 7–25)
CALCIUM SPEC-SCNC: 9.3 MG/DL (ref 8.6–10.5)
CHLORIDE SERPL-SCNC: 101 MMOL/L (ref 98–107)
CO2 SERPL-SCNC: 25 MMOL/L (ref 22–29)
CREAT SERPL-MCNC: 0.58 MG/DL (ref 0.57–1)
CRP SERPL-MCNC: <0.3 MG/DL (ref 0–0.5)
DEPRECATED RDW RBC AUTO: 45.2 FL (ref 37–54)
EGFRCR SERPLBLD CKD-EPI 2021: 101.8 ML/MIN/1.73
EOSINOPHIL # BLD AUTO: 0.08 10*3/MM3 (ref 0–0.4)
EOSINOPHIL NFR BLD AUTO: 1.2 % (ref 0.3–6.2)
ERYTHROCYTE [DISTWIDTH] IN BLOOD BY AUTOMATED COUNT: 12.1 % (ref 12.3–15.4)
ERYTHROCYTE [SEDIMENTATION RATE] IN BLOOD: 11 MM/HR (ref 0–30)
GLUCOSE SERPL-MCNC: 95 MG/DL (ref 65–99)
HBA1C MFR BLD: 5.4 % (ref 4.8–5.6)
HCT VFR BLD AUTO: 40.7 % (ref 34–46.6)
HGB BLD-MCNC: 13.8 G/DL (ref 12–15.9)
IMM GRANULOCYTES # BLD AUTO: 0.01 10*3/MM3 (ref 0–0.05)
IMM GRANULOCYTES NFR BLD AUTO: 0.2 % (ref 0–0.5)
INR PPP: 0.85 (ref 0.89–1.12)
LYMPHOCYTES # BLD AUTO: 3.44 10*3/MM3 (ref 0.7–3.1)
LYMPHOCYTES NFR BLD AUTO: 53.1 % (ref 19.6–45.3)
MCH RBC QN AUTO: 34.1 PG (ref 26.6–33)
MCHC RBC AUTO-ENTMCNC: 33.9 G/DL (ref 31.5–35.7)
MCV RBC AUTO: 100.5 FL (ref 79–97)
MONOCYTES # BLD AUTO: 0.42 10*3/MM3 (ref 0.1–0.9)
MONOCYTES NFR BLD AUTO: 6.5 % (ref 5–12)
NEUTROPHILS NFR BLD AUTO: 2.49 10*3/MM3 (ref 1.7–7)
NEUTROPHILS NFR BLD AUTO: 38.4 % (ref 42.7–76)
NRBC BLD AUTO-RTO: 0 /100 WBC (ref 0–0.2)
PLATELET # BLD AUTO: 260 10*3/MM3 (ref 140–450)
PMV BLD AUTO: 8.3 FL (ref 6–12)
POTASSIUM SERPL-SCNC: 4.2 MMOL/L (ref 3.5–5.2)
PROTHROMBIN TIME: 11.7 SECONDS (ref 12.2–14.5)
RBC # BLD AUTO: 4.05 10*6/MM3 (ref 3.77–5.28)
SODIUM SERPL-SCNC: 137 MMOL/L (ref 136–145)
WBC NRBC COR # BLD AUTO: 6.48 10*3/MM3 (ref 3.4–10.8)

## 2024-05-07 PROCEDURE — 85610 PROTHROMBIN TIME: CPT

## 2024-05-07 PROCEDURE — 36415 COLL VENOUS BLD VENIPUNCTURE: CPT

## 2024-05-07 PROCEDURE — 85652 RBC SED RATE AUTOMATED: CPT

## 2024-05-07 PROCEDURE — 85025 COMPLETE CBC W/AUTO DIFF WBC: CPT

## 2024-05-07 PROCEDURE — 86140 C-REACTIVE PROTEIN: CPT

## 2024-05-07 PROCEDURE — 80048 BASIC METABOLIC PNL TOTAL CA: CPT

## 2024-05-07 PROCEDURE — 85730 THROMBOPLASTIN TIME PARTIAL: CPT

## 2024-05-07 PROCEDURE — 93005 ELECTROCARDIOGRAM TRACING: CPT

## 2024-05-07 PROCEDURE — 83036 HEMOGLOBIN GLYCOSYLATED A1C: CPT

## 2024-05-07 RX ORDER — SEMAGLUTIDE 0.25 MG/.5ML
INJECTION, SOLUTION SUBCUTANEOUS
COMMUNITY
Start: 2024-04-17

## 2024-05-07 RX ORDER — FAMOTIDINE 20 MG/1
20 TABLET, FILM COATED ORAL DAILY PRN
COMMUNITY

## 2024-05-10 LAB
QT INTERVAL: 392 MS
QTC INTERVAL: 425 MS

## 2024-05-13 ENCOUNTER — TELEPHONE (OUTPATIENT)
Dept: ORTHOPEDIC SURGERY | Facility: CLINIC | Age: 64
End: 2024-05-13
Payer: COMMERCIAL

## 2024-05-13 NOTE — TELEPHONE ENCOUNTER
Patients appointment was changed to 05/23/24 @ 3:30pm at the [facility] Location.

## 2024-05-13 NOTE — TELEPHONE ENCOUNTER
Creating patients pre-op care plan for her RT TKA with Dr. Bradley on 05/21/2024. Physical Therapy referral is in, and patient has requested [facility] in [facility]. The first eval was scheduled for 05/30/24 with Klaus.     This is to far out from her initial surgery date. Physical Therapy needs to begin within 48 hours of surgery.     Valerie, can you call this patient and get her therapy moved to 05/23 if possible.

## 2024-05-15 ENCOUNTER — TELEPHONE (OUTPATIENT)
Dept: ORTHOPEDIC SURGERY | Facility: CLINIC | Age: 64
End: 2024-05-15
Payer: COMMERCIAL

## 2024-05-15 NOTE — TELEPHONE ENCOUNTER
PATIENT RETURNED MISSED CALL FROM [facility] PRE-OP CARE PLAN (PLEASE SEE ALREADY SIGNED TEL ENC FROM AN HOUR AGO 05/15 @ 1202)    HUB AGENT ATTEMPTED WARM TRANSFER -[phone] DIRECT # TO [facility] PER TEL ENC - NO ANSWER / VMAIL PICKED UP     PLEASE CALL / LEAVE VMAIL x 2 TO DISCUSS PRE-OP CARE PLAN     THANKS

## 2024-05-15 NOTE — TELEPHONE ENCOUNTER
Attempted to call patient to complete pre-op care plan. No answer. I left a voicemail for the patient to return my call.     Please transfer her to [phone] if she calls.

## 2024-05-16 ENCOUNTER — TRANSITIONAL CARE MANAGEMENT TELEPHONE ENCOUNTER (OUTPATIENT)
Dept: ORTHOPEDIC SURGERY | Facility: CLINIC | Age: 64
End: 2024-05-16
Payer: COMMERCIAL

## 2024-05-16 NOTE — OUTREACH NOTE
Discharge instructions reviewed with pt and spouse. All questions answered. Pre-Operative Care Plan          Patient: Marcia Hussein       YOB: 1960         Age/Gender: 63 y.o. female     Medical Record Number: [id number]     Surgeon:  DR. VIVIANE MEJIAS    Surgical Procedure Planned:    RT TKA    Date of Surgery Planned: 05/21/2024    Clearances Needed: NONE    A1C: 5.40    BMI: 30.58        Pharmacy: [facility]    Living Arrangements: HAS SON AND DAUGHTER-IN-LAW LIVING WITH HER, BUT DUE TO A SECOND STORY BEDROOM, SHE WILL BE STAYING WITH HER DAUGHTER HERE IN [place] FOR ABOUT A WEEK.    Appropriate DME: HAS ALL DME BORROWED FROM A FRIEND.     Physical Therapy Location: [facility]    First Physical Therapy Appointment:  05/22 @ 1:15    Surgery : DAUGHTER - MEGHANA    DVT PX Plan: WILL BE DISCUSSED BY DR. MEJIAS    D/C Plan: D/C HOME WITH DAUGHTERS ASSISTANCE. HAS ALL DME. PHYSICAL THERAPY WILL BEGIN ON 05/22 WITH [facility]. DR. MEJIAS WILL DISCUSS DVT PROPHYLAXIS WITH PATIENT.

## 2024-05-20 ENCOUNTER — ANESTHESIA EVENT (OUTPATIENT)
Dept: PERIOP | Facility: HOSPITAL | Age: 64
End: 2024-05-20
Payer: COMMERCIAL

## 2024-05-20 RX ORDER — FAMOTIDINE 10 MG/ML
20 INJECTION, SOLUTION INTRAVENOUS ONCE
Status: CANCELLED | OUTPATIENT
Start: 2024-05-20 | End: 2024-05-20

## 2024-05-21 ENCOUNTER — ANESTHESIA EVENT CONVERTED (OUTPATIENT)
Dept: ANESTHESIOLOGY | Facility: HOSPITAL | Age: 64
End: 2024-05-21
Payer: COMMERCIAL

## 2024-05-21 ENCOUNTER — TELEPHONE (OUTPATIENT)
Age: 64
End: 2024-05-21
Payer: COMMERCIAL

## 2024-05-21 ENCOUNTER — HOSPITAL ENCOUNTER (OUTPATIENT)
Facility: HOSPITAL | Age: 64
Discharge: HOME | End: 2024-05-21
Attending: ORTHOPAEDIC SURGERY | Admitting: ORTHOPAEDIC SURGERY
Payer: COMMERCIAL

## 2024-05-21 ENCOUNTER — ANESTHESIA (OUTPATIENT)
Dept: PERIOP | Facility: HOSPITAL | Age: 64
End: 2024-05-21
Payer: COMMERCIAL

## 2024-05-21 ENCOUNTER — APPOINTMENT (OUTPATIENT)
Dept: GENERAL RADIOLOGY | Facility: HOSPITAL | Age: 64
End: 2024-05-21
Payer: COMMERCIAL

## 2024-05-21 VITALS
SYSTOLIC BLOOD PRESSURE: 129 MMHG | BODY MASS INDEX: 28.88 KG/M2 | DIASTOLIC BLOOD PRESSURE: 70 MMHG | TEMPERATURE: 97.5 F | RESPIRATION RATE: 14 BRPM | OXYGEN SATURATION: 94 % | HEART RATE: 59 BPM | WEIGHT: 163 LBS | HEIGHT: 63 IN

## 2024-05-21 DIAGNOSIS — M17.11 PRIMARY OSTEOARTHRITIS OF RIGHT KNEE: Primary | ICD-10-CM

## 2024-05-21 DIAGNOSIS — M17.12 PRIMARY OSTEOARTHRITIS OF LEFT KNEE: ICD-10-CM

## 2024-05-21 LAB — GLUCOSE BLDC GLUCOMTR-MCNC: 87 MG/DL (ref 70–130)

## 2024-05-21 PROCEDURE — 25010000002 ONDANSETRON PER 1 MG: Performed by: NURSE ANESTHETIST, CERTIFIED REGISTERED

## 2024-05-21 PROCEDURE — 25010000002 PROPOFOL 10 MG/ML EMULSION: Performed by: NURSE ANESTHETIST, CERTIFIED REGISTERED

## 2024-05-21 PROCEDURE — C1755 CATHETER, INTRASPINAL: HCPCS | Performed by: ORTHOPAEDIC SURGERY

## 2024-05-21 PROCEDURE — 25010000002 CEFAZOLIN PER 500 MG: Performed by: ORTHOPAEDIC SURGERY

## 2024-05-21 PROCEDURE — 25010000002 ROPIVACAINE HCL-NACL 0.2-0.9 % SOLUTION: Performed by: NURSE ANESTHETIST, CERTIFIED REGISTERED

## 2024-05-21 PROCEDURE — 25010000002 BUPIVACAINE (PF) 0.25 % SOLUTION: Performed by: ANESTHESIOLOGY

## 2024-05-21 PROCEDURE — 25010000002 DEXAMETHASONE PER 1 MG: Performed by: NURSE ANESTHETIST, CERTIFIED REGISTERED

## 2024-05-21 PROCEDURE — 97110 THERAPEUTIC EXERCISES: CPT

## 2024-05-21 PROCEDURE — 25810000003 LACTATED RINGERS PER 1000 ML: Performed by: ANESTHESIOLOGY

## 2024-05-21 PROCEDURE — 25010000002 MIDAZOLAM PER 1 MG: Performed by: ANESTHESIOLOGY

## 2024-05-21 PROCEDURE — 73560 X-RAY EXAM OF KNEE 1 OR 2: CPT

## 2024-05-21 PROCEDURE — C1713 ANCHOR/SCREW BN/BN,TIS/BN: HCPCS | Performed by: ORTHOPAEDIC SURGERY

## 2024-05-21 PROCEDURE — 20985 CPTR-ASST DIR MS PX: CPT | Performed by: PHYSICIAN ASSISTANT

## 2024-05-21 PROCEDURE — 27447 TOTAL KNEE ARTHROPLASTY: CPT | Performed by: PHYSICIAN ASSISTANT

## 2024-05-21 PROCEDURE — 82948 REAGENT STRIP/BLOOD GLUCOSE: CPT

## 2024-05-21 PROCEDURE — 97116 GAIT TRAINING THERAPY: CPT

## 2024-05-21 PROCEDURE — 25810000003 SODIUM CHLORIDE 0.9 % SOLUTION: Performed by: ORTHOPAEDIC SURGERY

## 2024-05-21 PROCEDURE — C1776 JOINT DEVICE (IMPLANTABLE): HCPCS | Performed by: ORTHOPAEDIC SURGERY

## 2024-05-21 PROCEDURE — 25010000002 BUPIVACAINE 0.5 % SOLUTION: Performed by: ANESTHESIOLOGY

## 2024-05-21 PROCEDURE — 97161 PT EVAL LOW COMPLEX 20 MIN: CPT

## 2024-05-21 PROCEDURE — 27447 TOTAL KNEE ARTHROPLASTY: CPT | Performed by: ORTHOPAEDIC SURGERY

## 2024-05-21 PROCEDURE — 25010000002 ROPIVACAINE PER 1 MG: Performed by: ORTHOPAEDIC SURGERY

## 2024-05-21 PROCEDURE — 20985 CPTR-ASST DIR MS PX: CPT | Performed by: ORTHOPAEDIC SURGERY

## 2024-05-21 DEVICE — PALACOS® R IS A FAST-CURING, RADIOPAQUE, POLY(METHYL METHACRYLATE)-BASED BONE CEMENT.PALACOS ® R CONTAINS THE X-RAY CONTRAST MEDIUM ZIRCONIUM DIOXIDE. TO IMPROVE VISIBILITY IN THE SURGICAL FIELD PALACOS ® R HAS BEEN COLOURED WITH CHLOROPHYLL (E141). THE BONE CEMENT IS PREPARED DIRECTLY BEFORE USE BY MIXING A POLYMER POWDER COMPONENT WITH A LIQUID MONOMER COMPONENT. A DUCTILE DOUGH FORMS WHICH CURES WITHIN A FEW MINUTES.
Type: IMPLANTABLE DEVICE | Site: KNEE | Status: FUNCTIONAL
Brand: PALACOS®

## 2024-05-21 DEVICE — IMPLANTABLE DEVICE: Type: IMPLANTABLE DEVICE | Site: KNEE | Status: FUNCTIONAL

## 2024-05-21 DEVICE — LEGION PS HIGH FLEX XLPE SZ 1-2 12MM
Type: IMPLANTABLE DEVICE | Site: KNEE | Status: FUNCTIONAL
Brand: LEGION

## 2024-05-21 DEVICE — LEGION NARROW POSTERIOR STABILIZED                                    OXINIUM SIZE 4N LEFT
Type: IMPLANTABLE DEVICE | Site: KNEE | Status: FUNCTIONAL
Brand: LEGION

## 2024-05-21 DEVICE — DEV CONTRL TISS STRATAFIX SYMM PDS PLUS VIL CT-1 45CM: Type: IMPLANTABLE DEVICE | Site: KNEE | Status: FUNCTIONAL

## 2024-05-21 DEVICE — GENESIS II NON-POROUS TIBIAL                                    BASEPLATE SIZE 2 LEFT
Type: IMPLANTABLE DEVICE | Site: KNEE | Status: FUNCTIONAL
Brand: GENESIS II

## 2024-05-21 DEVICE — DEV CONTRL TISS STRATAFIX SPIRAL MNCRYL UD 3/0 PLS 60CM: Type: IMPLANTABLE DEVICE | Site: KNEE | Status: FUNCTIONAL

## 2024-05-21 DEVICE — GEN II 7.5MM RESUR PAT 32MM
Type: IMPLANTABLE DEVICE | Site: KNEE | Status: FUNCTIONAL
Brand: GENESIS II

## 2024-05-21 RX ORDER — ROPIVACAINE HYDROCHLORIDE 2 MG/ML
INJECTION, SOLUTION EPIDURAL; INFILTRATION; PERINEURAL CONTINUOUS
Status: DISCONTINUED | OUTPATIENT
Start: 2024-05-21 | End: 2024-05-21 | Stop reason: HOSPADM

## 2024-05-21 RX ORDER — NALOXONE HCL 0.4 MG/ML
0.4 VIAL (ML) INJECTION
Status: DISCONTINUED | OUTPATIENT
Start: 2024-05-21 | End: 2024-05-21 | Stop reason: HOSPADM

## 2024-05-21 RX ORDER — CITALOPRAM 20 MG/1
20 TABLET ORAL DAILY
Status: DISCONTINUED | OUTPATIENT
Start: 2024-05-21 | End: 2024-05-21 | Stop reason: HOSPADM

## 2024-05-21 RX ORDER — LIDOCAINE HYDROCHLORIDE 10 MG/ML
INJECTION, SOLUTION EPIDURAL; INFILTRATION; INTRACAUDAL; PERINEURAL AS NEEDED
Status: DISCONTINUED | OUTPATIENT
Start: 2024-05-21 | End: 2024-05-21 | Stop reason: SURG

## 2024-05-21 RX ORDER — MELOXICAM 15 MG/1
15 TABLET ORAL DAILY
Status: DISCONTINUED | OUTPATIENT
Start: 2024-05-22 | End: 2024-05-21 | Stop reason: HOSPADM

## 2024-05-21 RX ORDER — FENTANYL CITRATE 50 UG/ML
50 INJECTION, SOLUTION INTRAMUSCULAR; INTRAVENOUS
Status: DISCONTINUED | OUTPATIENT
Start: 2024-05-21 | End: 2024-05-21 | Stop reason: HOSPADM

## 2024-05-21 RX ORDER — LOSARTAN POTASSIUM 25 MG/1
25 TABLET ORAL DAILY
Status: DISCONTINUED | OUTPATIENT
Start: 2024-05-22 | End: 2024-05-21 | Stop reason: HOSPADM

## 2024-05-21 RX ORDER — HYDROMORPHONE HYDROCHLORIDE 1 MG/ML
0.5 INJECTION, SOLUTION INTRAMUSCULAR; INTRAVENOUS; SUBCUTANEOUS
Status: DISCONTINUED | OUTPATIENT
Start: 2024-05-21 | End: 2024-05-21 | Stop reason: HOSPADM

## 2024-05-21 RX ORDER — TRANEXAMIC ACID 10 MG/ML
1000 INJECTION, SOLUTION INTRAVENOUS ONCE
Status: COMPLETED | OUTPATIENT
Start: 2024-05-21 | End: 2024-05-21

## 2024-05-21 RX ORDER — OXYCODONE HYDROCHLORIDE 5 MG/1
5 TABLET ORAL EVERY 4 HOURS PRN
Qty: 40 TABLET | Refills: 0 | Status: SHIPPED | OUTPATIENT
Start: 2024-05-21 | End: 2024-05-31 | Stop reason: SDUPTHER

## 2024-05-21 RX ORDER — ONDANSETRON 4 MG/1
4 TABLET, ORALLY DISINTEGRATING ORAL EVERY 6 HOURS PRN
Status: DISCONTINUED | OUTPATIENT
Start: 2024-05-21 | End: 2024-05-21 | Stop reason: HOSPADM

## 2024-05-21 RX ORDER — OXYCODONE HYDROCHLORIDE 5 MG/1
5 TABLET ORAL EVERY 4 HOURS PRN
Status: DISCONTINUED | OUTPATIENT
Start: 2024-05-21 | End: 2024-05-21 | Stop reason: HOSPADM

## 2024-05-21 RX ORDER — PROPOFOL 10 MG/ML
VIAL (ML) INTRAVENOUS AS NEEDED
Status: DISCONTINUED | OUTPATIENT
Start: 2024-05-21 | End: 2024-05-21 | Stop reason: SURG

## 2024-05-21 RX ORDER — ONDANSETRON 2 MG/ML
INJECTION INTRAMUSCULAR; INTRAVENOUS AS NEEDED
Status: DISCONTINUED | OUTPATIENT
Start: 2024-05-21 | End: 2024-05-21 | Stop reason: SURG

## 2024-05-21 RX ORDER — MIDAZOLAM HYDROCHLORIDE 1 MG/ML
1 INJECTION INTRAMUSCULAR; INTRAVENOUS
Status: DISCONTINUED | OUTPATIENT
Start: 2024-05-21 | End: 2024-05-21 | Stop reason: HOSPADM

## 2024-05-21 RX ORDER — ONDANSETRON 2 MG/ML
4 INJECTION INTRAMUSCULAR; INTRAVENOUS EVERY 6 HOURS PRN
Status: DISCONTINUED | OUTPATIENT
Start: 2024-05-21 | End: 2024-05-21 | Stop reason: HOSPADM

## 2024-05-21 RX ORDER — BUPIVACAINE HYDROCHLORIDE 5 MG/ML
INJECTION, SOLUTION PERINEURAL
Status: COMPLETED | OUTPATIENT
Start: 2024-05-21 | End: 2024-05-21

## 2024-05-21 RX ORDER — ROPIVACAINE HYDROCHLORIDE 5 MG/ML
INJECTION, SOLUTION EPIDURAL; INFILTRATION; PERINEURAL AS NEEDED
Status: DISCONTINUED | OUTPATIENT
Start: 2024-05-21 | End: 2024-05-21 | Stop reason: HOSPADM

## 2024-05-21 RX ORDER — SODIUM CHLORIDE 0.9 % (FLUSH) 0.9 %
10 SYRINGE (ML) INJECTION AS NEEDED
Status: DISCONTINUED | OUTPATIENT
Start: 2024-05-21 | End: 2024-05-21 | Stop reason: HOSPADM

## 2024-05-21 RX ORDER — SODIUM CHLORIDE 9 MG/ML
40 INJECTION, SOLUTION INTRAVENOUS AS NEEDED
Status: DISCONTINUED | OUTPATIENT
Start: 2024-05-21 | End: 2024-05-21 | Stop reason: HOSPADM

## 2024-05-21 RX ORDER — MIDAZOLAM HYDROCHLORIDE 1 MG/ML
2 INJECTION INTRAMUSCULAR; INTRAVENOUS ONCE
Status: COMPLETED | OUTPATIENT
Start: 2024-05-21 | End: 2024-05-21

## 2024-05-21 RX ORDER — SODIUM CHLORIDE 0.9 % (FLUSH) 0.9 %
1-10 SYRINGE (ML) INJECTION AS NEEDED
Status: DISCONTINUED | OUTPATIENT
Start: 2024-05-21 | End: 2024-05-21 | Stop reason: HOSPADM

## 2024-05-21 RX ORDER — DEXAMETHASONE SODIUM PHOSPHATE 4 MG/ML
INJECTION, SOLUTION INTRA-ARTICULAR; INTRALESIONAL; INTRAMUSCULAR; INTRAVENOUS; SOFT TISSUE AS NEEDED
Status: DISCONTINUED | OUTPATIENT
Start: 2024-05-21 | End: 2024-05-21 | Stop reason: SURG

## 2024-05-21 RX ORDER — ROPIVACAINE HYDROCHLORIDE 2 MG/ML
INJECTION, SOLUTION EPIDURAL; INFILTRATION; PERINEURAL CONTINUOUS
Status: DISCONTINUED | OUTPATIENT
Start: 2024-05-21 | End: 2024-05-21

## 2024-05-21 RX ORDER — SODIUM CHLORIDE 0.9 % (FLUSH) 0.9 %
10 SYRINGE (ML) INJECTION EVERY 12 HOURS SCHEDULED
Status: DISCONTINUED | OUTPATIENT
Start: 2024-05-21 | End: 2024-05-21 | Stop reason: HOSPADM

## 2024-05-21 RX ORDER — DOCUSATE SODIUM 100 MG/1
100 CAPSULE, LIQUID FILLED ORAL 2 TIMES DAILY
Qty: 30 CAPSULE | Refills: 0 | Status: SHIPPED | OUTPATIENT
Start: 2024-05-21 | End: 2024-06-05

## 2024-05-21 RX ORDER — ASPIRIN 81 MG/1
81 TABLET ORAL EVERY 12 HOURS SCHEDULED
Status: DISCONTINUED | OUTPATIENT
Start: 2024-05-22 | End: 2024-05-21 | Stop reason: HOSPADM

## 2024-05-21 RX ORDER — FAMOTIDINE 20 MG/1
20 TABLET, FILM COATED ORAL DAILY PRN
Status: DISCONTINUED | OUTPATIENT
Start: 2024-05-21 | End: 2024-05-21 | Stop reason: HOSPADM

## 2024-05-21 RX ORDER — TRANEXAMIC ACID 10 MG/ML
1000 INJECTION, SOLUTION INTRAVENOUS ONCE
Status: DISCONTINUED | OUTPATIENT
Start: 2024-05-21 | End: 2024-05-21 | Stop reason: HOSPADM

## 2024-05-21 RX ORDER — ZOLPIDEM TARTRATE 5 MG/1
5 TABLET ORAL NIGHTLY
Status: DISCONTINUED | OUTPATIENT
Start: 2024-05-21 | End: 2024-05-21 | Stop reason: HOSPADM

## 2024-05-21 RX ORDER — DROPERIDOL 2.5 MG/ML
0.62 INJECTION, SOLUTION INTRAMUSCULAR; INTRAVENOUS ONCE AS NEEDED
Status: DISCONTINUED | OUTPATIENT
Start: 2024-05-21 | End: 2024-05-21 | Stop reason: HOSPADM

## 2024-05-21 RX ORDER — ASPIRIN 81 MG/1
81 TABLET ORAL 2 TIMES DAILY
Qty: 60 TABLET | Refills: 0 | Status: SHIPPED | OUTPATIENT
Start: 2024-05-22

## 2024-05-21 RX ORDER — LIDOCAINE HYDROCHLORIDE 10 MG/ML
0.5 INJECTION, SOLUTION EPIDURAL; INFILTRATION; INTRACAUDAL; PERINEURAL ONCE AS NEEDED
Status: COMPLETED | OUTPATIENT
Start: 2024-05-21 | End: 2024-05-21

## 2024-05-21 RX ORDER — NALOXONE HCL 0.4 MG/ML
0.1 VIAL (ML) INJECTION
Status: DISCONTINUED | OUTPATIENT
Start: 2024-05-21 | End: 2024-05-21 | Stop reason: HOSPADM

## 2024-05-21 RX ORDER — MAGNESIUM HYDROXIDE 1200 MG/15ML
LIQUID ORAL AS NEEDED
Status: DISCONTINUED | OUTPATIENT
Start: 2024-05-21 | End: 2024-05-21 | Stop reason: HOSPADM

## 2024-05-21 RX ORDER — MELOXICAM 15 MG/1
15 TABLET ORAL ONCE
Status: COMPLETED | OUTPATIENT
Start: 2024-05-21 | End: 2024-05-21

## 2024-05-21 RX ORDER — FAMOTIDINE 20 MG/1
20 TABLET, FILM COATED ORAL ONCE
Status: COMPLETED | OUTPATIENT
Start: 2024-05-21 | End: 2024-05-21

## 2024-05-21 RX ORDER — BUPIVACAINE HYDROCHLORIDE 2.5 MG/ML
INJECTION, SOLUTION EPIDURAL; INFILTRATION; INTRACAUDAL
Status: COMPLETED | OUTPATIENT
Start: 2024-05-21 | End: 2024-05-21

## 2024-05-21 RX ORDER — SODIUM CHLORIDE, SODIUM LACTATE, POTASSIUM CHLORIDE, CALCIUM CHLORIDE 600; 310; 30; 20 MG/100ML; MG/100ML; MG/100ML; MG/100ML
9 INJECTION, SOLUTION INTRAVENOUS CONTINUOUS
Status: DISCONTINUED | OUTPATIENT
Start: 2024-05-21 | End: 2024-05-21

## 2024-05-21 RX ORDER — SODIUM CHLORIDE 9 MG/ML
120 INJECTION, SOLUTION INTRAVENOUS CONTINUOUS
Status: DISCONTINUED | OUTPATIENT
Start: 2024-05-21 | End: 2024-05-21 | Stop reason: HOSPADM

## 2024-05-21 RX ORDER — ACETAMINOPHEN 500 MG
1000 TABLET ORAL ONCE
Status: COMPLETED | OUTPATIENT
Start: 2024-05-21 | End: 2024-05-21

## 2024-05-21 RX ORDER — PREGABALIN 150 MG/1
150 CAPSULE ORAL ONCE
Status: COMPLETED | OUTPATIENT
Start: 2024-05-21 | End: 2024-05-21

## 2024-05-21 RX ADMIN — TRANEXAMIC ACID 1000 MG: 10 INJECTION, SOLUTION INTRAVENOUS at 11:21

## 2024-05-21 RX ADMIN — DEXAMETHASONE SODIUM PHOSPHATE 8 MG: 4 INJECTION INTRA-ARTICULAR; INTRALESIONAL; INTRAMUSCULAR; INTRAVENOUS; SOFT TISSUE at 10:25

## 2024-05-21 RX ADMIN — ONDANSETRON 4 MG: 2 INJECTION INTRAMUSCULAR; INTRAVENOUS at 11:58

## 2024-05-21 RX ADMIN — BUPIVACAINE HYDROCHLORIDE 20 ML: 2.5 INJECTION, SOLUTION EPIDURAL; INFILTRATION; INTRACAUDAL; PERINEURAL at 12:15

## 2024-05-21 RX ADMIN — SODIUM CHLORIDE 120 ML/HR: 9 INJECTION, SOLUTION INTRAVENOUS at 15:43

## 2024-05-21 RX ADMIN — PROPOFOL 50 MG: 10 INJECTION, EMULSION INTRAVENOUS at 10:19

## 2024-05-21 RX ADMIN — LIDOCAINE HYDROCHLORIDE 0.5 ML: 10 INJECTION, SOLUTION EPIDURAL; INFILTRATION; INTRACAUDAL; PERINEURAL at 09:02

## 2024-05-21 RX ADMIN — SODIUM CHLORIDE 2 G: 900 INJECTION INTRAVENOUS at 10:34

## 2024-05-21 RX ADMIN — BUPIVACAINE HYDROCHLORIDE 1.8 ML: 5 INJECTION, SOLUTION PERINEURAL at 10:28

## 2024-05-21 RX ADMIN — ACETAMINOPHEN 1000 MG: 500 TABLET ORAL at 09:02

## 2024-05-21 RX ADMIN — FAMOTIDINE 20 MG: 20 TABLET, FILM COATED ORAL at 09:02

## 2024-05-21 RX ADMIN — SODIUM CHLORIDE 2 G: 900 INJECTION INTRAVENOUS at 17:39

## 2024-05-21 RX ADMIN — LIDOCAINE HYDROCHLORIDE 50 MG: 10 INJECTION, SOLUTION EPIDURAL; INFILTRATION; INTRACAUDAL; PERINEURAL at 10:19

## 2024-05-21 RX ADMIN — Medication 1000 MG: at 12:25

## 2024-05-21 RX ADMIN — PROPOFOL 100 MCG/KG/MIN: 10 INJECTION, EMULSION INTRAVENOUS at 10:23

## 2024-05-21 RX ADMIN — MIDAZOLAM HYDROCHLORIDE 2 MG: 1 INJECTION, SOLUTION INTRAMUSCULAR; INTRAVENOUS at 09:36

## 2024-05-21 RX ADMIN — MELOXICAM 15 MG: 15 TABLET ORAL at 09:02

## 2024-05-21 RX ADMIN — OXYCODONE HYDROCHLORIDE 5 MG: 5 TABLET ORAL at 17:39

## 2024-05-21 RX ADMIN — CITALOPRAM HYDROBROMIDE 20 MG: 20 TABLET ORAL at 15:42

## 2024-05-21 RX ADMIN — PREGABALIN 150 MG: 150 CAPSULE ORAL at 09:02

## 2024-05-21 RX ADMIN — SODIUM CHLORIDE, POTASSIUM CHLORIDE, SODIUM LACTATE AND CALCIUM CHLORIDE 9 ML/HR: 600; 310; 30; 20 INJECTION, SOLUTION INTRAVENOUS at 09:16

## 2024-05-21 RX ADMIN — TRANEXAMIC ACID 1000 MG: 10 INJECTION, SOLUTION INTRAVENOUS at 10:28

## 2024-05-21 NOTE — PLAN OF CARE
Problem: Adult Inpatient Plan of Care  Goal: Plan of Care Review  Recent Flowsheet Documentation  Taken 5/21/2024 1605 by Desirae Yee, PT  Progress: improving  Plan of Care Reviewed With:   patient   daughter  Outcome Evaluation: Patient ambulated 200 feet with RW, CGA, step through gait pattern, limited by pain. One mild instance of L knee buckling with ambulation but no further buckling noted with progressive mobility and stair training. Patient climbed 3 steps with SPC and min assist via L HHA. Instructed patient and family on correct use of SPC and gait belt for stairs. L knee AROM limited today, lacking 14 degrees of extension and demonstrating 75 degrees of flexion AAROM in sitting. Patient currently below baseline functioning, demonstrating decreased functional mobility status and decreased R knee ROM/strength. Will address these deficits to promote return to PLOF. Recommend d/c home with assist and HHPT. Patient should be able to safely mobilize about the home upon d/c home today.   Goal Outcome Evaluation:  Plan of Care Reviewed With: patient, daughter        Progress: improving  Outcome Evaluation: Patient ambulated 200 feet with RW, CGA, step through gait pattern, limited by pain. One mild instance of L knee buckling with ambulation but no further buckling noted with progressive mobility and stair training. Patient climbed 3 steps with SPC and min assist via L HHA. Instructed patient and family on correct use of SPC and gait belt for stairs. L knee AROM limited today, lacking 14 degrees of extension and demonstrating 75 degrees of flexion AAROM in sitting. Patient currently below baseline functioning, demonstrating decreased functional mobility status and decreased R knee ROM/strength. Will address these deficits to promote return to PLOF. Recommend d/c home with assist and HHPT. Patient should be able to safely mobilize about the home upon d/c home today.      Anticipated Discharge  Disposition (PT): home with assist, home with home health

## 2024-05-21 NOTE — TELEPHONE ENCOUNTER
Sent [employer] message to patient to see if she would like to switch locations for her appts since her zip code is closer.      HUB ok to r/s to 6/14 with Nette MATTA) ROT

## 2024-05-21 NOTE — H&P
Pre-Op H&P  Marcia Hussein  [id number]  1960      Chief complaint: Left knee pain      Subjective:  Patient is a 63 y.o.female presents for scheduled surgery by Dr. Bradley. She anticipates a TOTAL KNEE ARTHROPLASTY WITH CORI ROBOT - LEFT  today.  Has been painful for many years.  She denies use of assistive device for ambulation or recent falls.  Conservative treatments failed to provide lasting benefits.      Review of Systems:  Constitutional-- No fever, chills or sweats. No fatigue.  CV-- No chest pain, palpitation or syncope. +HTN  Resp-- No SOB, cough, hemoptysis  Skin--No rashes or lesions      Allergies: No Known Allergies      Home Meds:  Medications Prior to Admission   Medication Sig Dispense Refill Last Dose    Chlorhexidine Gluconate 4 % solution Apply topically to the appropriate area as directed Daily 5 days prior to surgery. 237 mL 0 5/20/2024    citalopram (CeleXA) 20 MG tablet Take 1 tablet by mouth Daily.   5/20/2024    famotidine (PEPCID) 20 MG tablet Take 1 tablet by mouth Daily As Needed for Indigestion or Heartburn.   5/20/2024    losartan (COZAAR) 25 MG tablet Take 1 tablet by mouth Daily.   5/20/2024    metFORMIN ER (GLUCOPHAGE-XR) 500 MG 24 hr tablet Take 1 tablet by mouth Daily.   5/20/2024    meloxicam (MOBIC) 7.5 MG tablet 1 Oral Daily with food. 30 tablet 1 More than a month    Wegovy 0.25 MG/0.5ML solution auto-injector Pt to hold 1 week prior to procedure   5/12/2024    zolpidem (AMBIEN) 10 MG tablet Take 0.5 tablets by mouth Every Night.   5/19/2024         PMH:   Past Medical History:   Diagnosis Date    Anxiety     Arthritis     Diabetes mellitus     GERD (gastroesophageal reflux disease)     Hypertension     Knee swelling Dec 2021    Periarthritis of shoulder not noted    Rotator cuff syndrome 2022     PSH:    Past Surgical History:   Procedure Laterality Date    COLONOSCOPY      ELBOW PROCEDURE Left 2015    orif    SHOULDER SURGERY Right 2012    frozen shoulder  "      Immunization History:  Influenza: UTD  Pneumococcal: No  Tetanus: UTD  Covid : x5    Social History:   Tobacco:   Social History     Tobacco Use   Smoking Status Former    Current packs/day: 0.00    Types: Cigarettes    Start date: 1975    Quit date: 1983    Years since quittin.4   Smokeless Tobacco Never      Alcohol:     Social History     Substance and Sexual Activity   Alcohol Use Yes    Alcohol/week: 5.0 standard drinks of alcohol    Types: 5 Glasses of wine per week         Physical Exam:/90 (BP Location: Right arm, Patient Position: Lying)   Pulse 83   Temp 98.2 °F (36.8 °C) (Temporal)   Resp 16   Ht 160 cm (63\")   Wt 73.9 kg (163 lb)   SpO2 97%   BMI 28.87 kg/m²       General Appearance:    Alert, cooperative, no distress, appears stated age   Head:    Normocephalic, without obvious abnormality, atraumatic   Lungs:     Clear to auscultation bilaterally, respirations unlabored    Heart:   Regular rate and rhythm, S1 and S2 normal    Abdomen:    Soft without tenderness   Extremities:   Extremities normal, atraumatic, no cyanosis or edema   Skin:   Skin color, texture, turgor normal, no rashes or lesions   Neurologic:   Grossly intact     Results Review:     LABS:  Lab Results   Component Value Date    WBC 6.48 2024    HGB 13.8 2024    HCT 40.7 2024    .5 (H) 2024     2024    NEUTROABS 2.49 2024    GLUCOSE 95 2024    BUN 10 2024    CREATININE 0.58 2024     2024    K 4.2 2024     2024    CO2 25.0 2024    CALCIUM 9.3 2024       RADIOLOGY:  Imaging Results (Last 72 Hours)       ** No results found for the last 72 hours. **            I reviewed the patient's new clinical results.    Cancer Staging (if applicable)  Cancer Patient: __ yes __no __unknown; If yes, clinical stage T:__ N:__M:__, stage group or __N/A      Impression: Primary osteoarthritis of left knee      Plan: " TOTAL KNEE ARTHROPLASTY WITH CORI ROBOT - LEFT       ASIYA Robin   5/21/2024   08:57 EDT    Agree with above - plan for left TKA    Blake Bradley MD  05/21/24  09:56 EDT

## 2024-05-21 NOTE — OP NOTE
DATE OF PROCEDURE: 05/21/24    PREOPERATIVE DIAGNOSIS: left knee arthritis      POSTOPERATIVE DIAGNOSIS:  left knee arthritis    PROCEDURES PERFORMED:   left total knee arthroplasty with [employer] components (# 4 narrow posterior stabilized femur, # 2 tibia, 12 mm polyethylene, with 32 three peg patella) with CORI robotic assitance    Surgical Approach: Knee Medial Parapatellar    SURGEON: Blake Bradley MD    ASSISTANT: Zeina Iglesias PA-C  (Zeina Iglesias PA-C was present and necessary for positioning, draping, retraction, instrumentation and closure.)    SPECIMENS: None    IMPLANTS:   Implant Name Type Inv. Item Serial No.  Lot No. LRB No. Used Action   CMT BONE PALACOS R HI/VISC 1X40 - [id number] Implant CMT BONE PALACOS R HI/VISC 1X40  [facility] [id number] Left 2 Implanted   DEV CONTRL TISS STRATAFIX SPIRAL MNCRYL UD 3/0 PLS 60CM - [id number] Implant DEV CONTRL TISS STRATAFIX SPIRAL MNCRYL UD 3/0 PLS 60CM  [employer]  Left 1 Implanted   DEV CONTRL TISS STRATAFIX SYMM PDS PLUS KENAN CT-1 45CM - [id number] Implant DEV CONTRL TISS STRATAFIX SYMM PDS PLUS KENAN CT-1 45CM  [employer]  Left 1 Implanted   PATELLA RESRF GEN2 7.5X32MM - [id number] Implant PATELLA RESRF GEN2 7.5X32MM  [employer] [id number] Left 1 Implanted   COMP FEM LEGION OXINIUM PS NRW SZ4N LT - [id number] Implant COMP FEM LEGION OXINIUM PS NRW SZ4N LT  [employer] [id number] Left 1 Implanted   BASE TIB/KN GEN2 NONPOR TI SZ2 LT - [id number] Implant BASE TIB/KN GEN2 NONPOR TI SZ2 LT  [employer] [id number] Left 1 Implanted   INSRT ART/KN LEGION PS HF XLPE SZ1TO2 12MM - [id number] Implant INSRT ART/KN LEGION PS HF XLPE SZ1TO2 12MM  [employer] [id number] Left 1 Implanted         ANESTHESIA:  Spinal    STAFF:  Circulator: Landon Patel RN; Edwin Redding RN  Scrub Person: Josh Cagle RN; Kimo Spence  Vendor Representative: [employer]  [employer])  Nursing Assistant: Tom Olsen  Assistant: Zeina Iglesias PA-C    TOURNIQUET TIME: 16 minutes    ESTIMATED BLOOD LOSS: 100ml     COMPLICATIONS: None    PREOPERATIVE ANTIBIOTICS: Ancef 2 g    INDICATIONS: The patient is a 63 y.o. female with debilitating left knee pain secondary to osteoarthritis that failed to improve in spite of conservative treatment .  Options have been discussed at length with the patient and the patient has had an extended course of conservative treatment without long-term benefit. The patient has reached the point where the patient desires total knee arthroplasty surgery and understands the risks, benefits, and alternatives. Consent was obtained. Please see my office notes for details with regard to preoperative counseling and operative rationale.     DESCRIPTION OF PROCEDURE: The patient was positively identified in the preoperative holding area and brought to the operating suite and placed in a supine position. After adequate spinal anesthetic had been achieved, the left lower extremity was prepped and draped in the usual sterile fashion.  After application of a tourniquet to the left upper thigh, which was used during the procedure for a total 16 minutes during the cementation process only. Landmarks of the knee were identified and timeout procedure was performed to confirm the operative site, as well as other parameters. Following the sterile prep and drape, a skin incision was made just off the medial aspect of midline for a medial parapatellar approach. Following a sharp skin incision, dissection was carried down to the level of the extensor mechanism and a medial parapatellar arthrotomy was made and the patella was tucked into the lateral gutter.  Anterior horns of the medial and lateral menisci were removed, and ACL transected.  Osteophytes were also removed.  Description of arthritis: Bone-on-bone contact medial compartment, tricompartmental osteophytes, varus  alignment, large posterior osteophytes and loose body.      [employer] robotic system was then employed to assist with preparation of the femoral and tibial bone surfaces.  Femoral and tibial arrays were placed, as well as markers in both the femur and tibia.  System was registered in a systematic fashion, and 3D models created of both the femoral and tibial aspects.  Range of motion and gap assessments were also performed, and implants were selected and appropriately sized and oriented both the femur and tibial aspects starting with the flexion gap, and then progressing to the extension gap.  Planning was made for a 4 cruciate retaining femoral component and a 2 tibial component with a 9 mm insert.  Once balancing had been achieved, distal femoral surface was then prepared with the CORI kiana, followed by preparation for the 4-in-1 cutting block.  Proximal tibial cut was then performed in a guided fashion, posterior condyles were freed of osteophytes, and trial implants placed, namely a 4 femur with a 2 tibia and a 12 mm trial insert.  Range of motion and gap assessments were again performed, and determination was made for a posterior stabilized implant for additional stability required by the anatomy of her knee.  Therefore, the box region was prepared for the posterior stabilized implant, and trial performed with a 12 mm insert with excellent balancing noted throughout the full arc of motion and no instability.  The patella was then prepared for a 32 three peg patella which had excellent tracking.      Therefore the trial components were removed, and preparations made for final placement, and final components were cemented in place with namely a # 2 tibia, # 4 narrow posterior stabilized femur, and a 32 three peg patella with a trial 12 mm insert for cement compression. All the excess cement was removed from the bone implant interface and allowed to harden. Tourniquet was deflated. Hemostasis was obtained with  electrocautery. There was no brisk bleeding noted in the popliteal fossa in particular. Therefore, the knee was copiously irrigated as it was between major steps, and the final 12 mm insert was placed as this was deemed appropriate for the patient's anatomy with full flexion and extension and no instability and attention was then directed towards closure. The medial parapatellar arthrotomy was closed with #1 Vicryl in an interrupted figure-of-eight fashion in 4 strategic locations followed by oversewing this from proximal to distal with a #1 StrataFix symmetric, which nicely sealed the joint, followed by closure of the deep fascial layer with #1 Vicryl in a buried interrupted fashion, followed by closure of the subcutaneous layer with 2-0 Vicryl and the skin with 3-0 Stratafix in a running subcuticular fashion.  Adhesive wound closure dressing was applied followed by a sterile dressing with 4 x 4's, abdominal pad, soft roll and Ace wrap. The patient tolerated the procedure well and was brought to the recovery room in good condition.     PLAN:  1.  The patient will begin early range of motion and weight-bearing per the post total knee arthroplasty protocol.   2.  I anticipate brief hospitalization for initial rehabilitation and pain control followed by continued rehabilitation home health/outpatient physical therapy setting.  Patient likely ready for discharge later today as long as she is cleared medically and by physical therapy.  Follow-up in 3 weeks as planned.   3.  Postoperative medical management with Dr. Pagan.  4.  Baby aspirin twice a day for a month will be utilized for DVT prophylaxis.       Blake Bradley MD  05/21/24  12:05 EDT

## 2024-05-21 NOTE — ANESTHESIA PREPROCEDURE EVALUATION
Anesthesia Evaluation     Patient summary reviewed and Nursing notes reviewed   no history of anesthetic complications:   NPO Solid Status: > 8 hours  NPO Liquid Status: > 2 hours           Airway   Mallampati: II  TM distance: >3 FB  Neck ROM: full  No difficulty expected  Dental - normal exam     Pulmonary - normal exam   (+) a smoker Former,  (-) asthma, sleep apnea  Cardiovascular - normal exam  Exercise tolerance: good (4-7 METS)    (+) hypertension  (-) dysrhythmias, angina, CHF, cardiac stents      Neuro/Psych  (+) psychiatric history Anxiety  (-) seizures, neuromuscular disease  GI/Hepatic/Renal/Endo    (+) GERD, diabetes mellitus    Musculoskeletal     Abdominal    Substance History   (+) alcohol use  (-) drug use     OB/GYN          Other        ROS/Med Hx Other: Wegovy 5/12/24 last dose; ~1 mo; no n/v/gerd/bloat associated with use; no n/v/gerd today  Hgb 13.8 plt 260 inr 0.85 k 4.2   Denies blood thinners, bleeding/clotting disorders.                  Anesthesia Plan    ASA 2     spinal     (Benefits, alternatives, and risks of neuraxial (failed block, damage to nearby structures  incl nerves/blood vessels), intravascular injection, hematoma req evacuation, headache,etc) discussed with patient.  Questions answered; pt desires to proceed.  Benefits and risks of nerve block/catheter discussed with patient ((including failed block, damage to nerve/nearby structures, intravascular injection)); questions answered, agreeable to proceed.    )  intravenous induction     Anesthetic plan, risks, benefits, and alternatives have been provided, discussed and informed consent has been obtained with: patient.    Plan discussed with CRNA.    CODE STATUS:

## 2024-05-21 NOTE — THERAPY EVALUATION
Patient Name: Marcia Hussein  : 1960    MRN: [id number]                              Today's Date: 2024       Admit Date: 2024    Visit Dx:     ICD-10-CM ICD-9-CM   1. Primary osteoarthritis of right knee  M17.11 715.16   2. Primary osteoarthritis of left knee  M17.12 715.16     Patient Active Problem List   Diagnosis    Degenerative arthritis of right knee    Primary osteoarthritis of left knee     Past Medical History:   Diagnosis Date    Anxiety     Arthritis     Diabetes mellitus     GERD (gastroesophageal reflux disease)     Hypertension     Knee swelling Dec 2021    Periarthritis of shoulder not noted    Rotator cuff syndrome      Past Surgical History:   Procedure Laterality Date    COLONOSCOPY      ELBOW PROCEDURE Left     orif    SHOULDER SURGERY Right     frozen shoulder      General Information       Row Name 24 1605          Physical Therapy Time and Intention    Document Type evaluation  -LR     Mode of Treatment physical therapy;individual therapy  -LR       Row Name 24 1605          General Information    Patient Profile Reviewed yes  -LR     Prior Level of Function independent:;all household mobility;community mobility;gait;transfer;bed mobility  not using AD PTA  -LR     Existing Precautions/Restrictions fall;other (see comments)  L adductor canal nerve catheter  -LR     Barriers to Rehab none identified  -LR       Row Name 24 1605          Living Environment    People in Home child(chandler), adult;other (see comments)  d/c to daughter's home; daughter can assist at all times  -LR       Row Name 24 1605          Home Main Entrance    Number of Stairs, Main Entrance three  -LR     Stair Railings, Main Entrance none  -LR       Row Name 24 1605          Stairs Within Home, Primary    Stairs, Within Home, Primary all needs met on main floor of home  -LR       Row Name 24 1605          Cognition    Orientation Status (Cognition) oriented x 4   -LR       Row Name 05/21/24 1605          Safety Issues, Functional Mobility    Safety Issues Affecting Function (Mobility) awareness of need for assistance;insight into deficits/self-awareness;positioning of assistive device;safety precautions follow-through/compliance;safety precaution awareness  -LR     Impairments Affecting Function (Mobility) endurance/activity tolerance;pain;range of motion (ROM);strength;motor control  -LR               User Key  (r) = Recorded By, (t) = Taken By, (c) = Cosigned By      Initials Name Provider Type    LR Desirae Yee, PT Physical Therapist                   Mobility       Row Name 05/21/24 1605          Bed Mobility    Bed Mobility supine-sit  -LR     Supine-Sit [place] (Bed Mobility) verbal cues;standby assist  -LR     Assistive Device (Bed Mobility) head of bed elevated  -LR     Comment, (Bed Mobility) Verbal cues to move LEs towards EOB and to push up from bed to raise trunk into sitting and to scoot hips out to get feet on floor. Denied dizziness upon sitting up.  -       Row Name 05/21/24 1605          Transfers    Comment, (Transfers) Verbal cues to push up from bed to stand and to reach back for chair to lower into sitting. Verbal cues to step L LE out before t/f for comfort. Assisted to and from bathroom with cues for correct sequencing for approach to commode. No knee buckling observed with turning in front of commode or chair.  -       Row Name 05/21/24 1605          Bed-Chair Transfer    Bed-Chair [place] (Transfers) not tested  -       Row Name 05/21/24 1605          Sit-Stand Transfer    Sit-Stand [place] (Transfers) verbal cues;contact guard;1 person to manage equipment  -LR     Assistive Device (Sit-Stand Transfers) walker, front-wheeled  -       Row Name 05/21/24 1605 05/21/24 1449       Gait/Stairs (Locomotion)    [place] Level (Gait) contact guard;2 person assist  -LR --    Assistive Device (Gait) walker, front-wheeled   -LR --    Patient was able to Ambulate yes  -LR no, other medical factors prevent ambulation  -LR    Reason Patient was unable to Ambulate -- Limited Motor Function related to Block  -LR    Distance in Feet (Gait) 200  -LR --    Deviations/Abnormal Patterns (Gait) bilateral deviations;josé miguel decreased;gait speed decreased;left sided deviations;antalgic  -LR --    Bilateral Gait Deviations forward flexed posture;heel strike decreased  -LR --    Left Sided Gait Deviations weight shift ability decreased;knee buckling, left side  -LR --    [place] Level (Stairs) verbal cues;minimum assist (75% patient effort);1 person to manage equipment  -LR --    Assistive Device (Stairs) cane, straight;other (see comments)  and HHA on L  -LR --    Handrail Location (Stairs) none  -LR --    Number of Steps (Stairs) 3  -LR --    Ascending Technique (Stairs) step-to-step  -LR --    Descending Technique (Stairs) step-to-step  -LR --    Comment, (Gait/Stairs) Patient initiated gait with step to gait pattern at slow pace with excessive UE weight bearing. Improved with cues for increased step length, increased L LE weight bearing/stance phase, decreased UE weight bearing, and continuous forward motion of RW. Progressed to step through gait pattern. Patient with one mild instance of L knee buckling. No further knee buckling observed throughout mobility eval. Cues for equal but shorter step length for step through gait pattern and decreased pace. Gait limited by fatigue and pain. Verbal cues to climb stairs one at a time and to step up with strong LE first and down with weak LE first. Verbal cues for correct placement and sequencing of SPC. Cues to weight bear through SPC on R and via HHA on L for support to unweight L LE. No knee buckling observed with stairs. Patient did require min assist via L HHA for stairs. Family will be present and can provide this assist to get into home. Instructed family on correct stair training technique  and to use gait belt.  -LR --      Row Name 05/21/24 1605          Mobility    Extremity Weight-bearing Status left lower extremity  -LR     Left Lower Extremity (Weight-bearing Status) weight-bearing as tolerated (WBAT)  -LR               User Key  (r) = Recorded By, (t) = Taken By, (c) = Cosigned By      Initials Name Provider Type    LR Desirae Yee, PT Physical Therapist                   Obj/Interventions       Row Name 05/21/24 1605          Range of Motion Comprehensive    General Range of Motion lower extremity range of motion deficits identified  -LR     Comment, General Range of Motion Surgical knee ROM: 14-75 degrees; lacking 14 degrees extension AROM, demonstrated 75 degrees flexion AAROM in sitting.  -       Row Name 05/21/24 1605          Strength Comprehensive (MMT)    General Manual Muscle Testing (MMT) Assessment lower extremity strength deficits identified  -       Row Name 05/21/24 1605          Motor Skills    Therapeutic Exercise knee;ankle;other (see comments)  cues for technique; min assist for heel slides and with end range of LAQ/SAQ  -       Row Name 05/21/24 1605          Knee (Therapeutic Exercise)    Knee (Therapeutic Exercise) isometric exercises;strengthening exercise;AROM (active range of motion)  -     Knee AROM (Therapeutic Exercise) left;heel slides;sitting;supine;3 repetitions  -LR     Knee Isometrics (Therapeutic Exercise) quad sets;left;supine;10 repetitions  -     Knee Strengthening (Therapeutic Exercise) SLR (straight leg raise);SAQ (short arc quad);LAQ (long arc quad);left;sitting;supine;other (see comments);3 repetitions  standing calf raises x3  -       Row Name 05/21/24 1605          Ankle (Therapeutic Exercise)    Ankle (Therapeutic Exercise) AROM (active range of motion)  -     Ankle AROM (Therapeutic Exercise) bilateral;dorsiflexion;plantarflexion;supine;10 repetitions  -       Row Name 05/21/24 1605          Balance    Balance Assessment  sitting static balance;sitting dynamic balance;standing static balance;standing dynamic balance  -LR     Static Sitting Balance standby assist  -LR     Dynamic Sitting Balance standby assist  -LR     Position, Sitting Balance unsupported;sitting edge of bed  -LR     Static Standing Balance contact guard;1 person to manage equipment  -LR     Dynamic Standing Balance contact guard;1 person to manage equipment  -LR     Position/Device Used, Standing Balance supported;walker, rolling  -LR       Row Name 05/21/24 1605          Sensory Assessment (Somatosensory)    Sensory Assessment (Somatosensory) LE sensation intact;other (see comments)  denies numbness/tingling; reports light touch equal and intact; able to actively DF bilaterally  -LR       Row Name 05/21/24 1605          General Lower Extremity Assessment (Range of Motion)    Lower Extremity: Range of Motion RLE ROM WFL  -LR       Row Name 05/21/24 1605          Lower Extremity (Manual Muscle Testing)    Lower Extremity: Manual Muscle Testing (MMT) right LE strength is WFL;left knee strength deficit  -LR     Comment, MMT: Lower Extremity L knee functionally 4-/5, independent with L SLR, one instance of mild L knee buckling with weight bearing  -LR               User Key  (r) = Recorded By, (t) = Taken By, (c) = Cosigned By      Initials Name Provider Type    LR Desirae Yee, PT Physical Therapist                   Goals/Plan       Row Name 05/21/24 1605          Bed Mobility Goal 1 (PT)    Activity/Assistive Device (Bed Mobility Goal 1, PT) sit to supine/supine to sit  -LR     [place] Level/Cues Needed (Bed Mobility Goal 1, PT) modified independence  -LR     Time Frame (Bed Mobility Goal 1, PT) long term goal (LTG);3 days  -LR     Progress/Outcomes (Bed Mobility Goal 1, PT) goal ongoing  -LR       Row Name 05/21/24 1605          Transfer Goal 1 (PT)    Activity/Assistive Device (Transfer Goal 1, PT) sit-to-stand/stand-to-sit;walker, rolling  -LR      [place] Level/Cues Needed (Transfer Goal 1, PT) modified independence  -LR     Time Frame (Transfer Goal 1, PT) long term goal (LTG);3 days  -LR     Progress/Outcome (Transfer Goal 1, PT) goal ongoing  -LR       Row Name 05/21/24 1605          Gait Training Goal 1 (PT)    Activity/Assistive Device (Gait Training Goal 1, PT) gait (walking locomotion);walker, rolling  -LR     [place] Level (Gait Training Goal 1, PT) modified independence  -LR     Distance (Gait Training Goal 1, PT) 500 feet  -LR     Time Frame (Gait Training Goal 1, PT) long term goal (LTG);3 days  -LR     Progress/Outcome (Gait Training Goal 1, PT) goal ongoing  -LR       Row Name 05/21/24 1605          ROM Goal 1 (PT)    ROM Goal 1 (PT) Surgical Knee ROM: 0-90 degrees AAROM  -LR     Time Frame (ROM Goal 1, PT) long-term goal (LTG);3 days  -LR       Row Name 05/21/24 1605          Stairs Goal 1 (PT)    Activity/Assistive Device (Stairs Goal 1, PT) ascending stairs;descending stairs;step-to-step;cane, straight;other (see comments)  HHA  -LR     [place] Level/Cues Needed (Stairs Goal 1, PT) contact guard required  -LR     Number of Stairs (Stairs Goal 1, PT) 3  -LR     Time Frame (Stairs Goal 1, PT) long term goal (LTG);3 days  -LR     Progress/Outcome (Stairs Goal 1, PT) goal ongoing  -LR       Row Name 05/21/24 1605          Therapy Assessment/Plan (PT)    Planned Therapy Interventions (PT) balance training;bed mobility training;gait training;home exercise program;ROM (range of motion);patient/family education;stair training;strengthening;stretching;transfer training  -LR               User Key  (r) = Recorded By, (t) = Taken By, (c) = Cosigned By      Initials Name Provider Type    LR Desirae Yee, PT Physical Therapist                   Clinical Impression       Row Name 05/21/24 1605          Pain    Pretreatment Pain Rating 6/10  -LR     Posttreatment Pain Rating 7/10  -LR     Pain Location - Side/Orientation Left  -LR      Pain Location anterior  -LR     Pain Location - knee  -LR     Pain Intervention(s) Ambulation/increased activity;Repositioned;Elevated;Cold applied  -LR       Row Name 05/21/24 1079          Plan of Care Review    Plan of Care Reviewed With patient;daughter  -LR     Progress improving  -LR     Outcome Evaluation Patient ambulated 200 feet with RW, CGA, step through gait pattern, limited by pain. One mild instance of L knee buckling with ambulation but no further buckling noted with progressive mobility and stair training. Patient climbed 3 steps with SPC and min assist via L HHA. Instructed patient and family on correct use of SPC and gait belt for stairs. L knee AROM limited today, lacking 14 degrees of extension and demonstrating 75 degrees of flexion AAROM in sitting. Patient currently below baseline functioning, demonstrating decreased functional mobility status and decreased R knee ROM/strength. Will address these deficits to promote return to PLOF. Recommend d/c home with assist and HHPT. Patient should be able to safely mobilize about the home upon d/c home today.  -LR       Row Name 05/21/24 2973          Therapy Assessment/Plan (PT)    Patient/Family Therapy Goals Statement (PT) go home, decrease pain  -LR     Rehab Potential (PT) good, to achieve stated therapy goals  -LR     Criteria for Skilled Interventions Met (PT) yes;meets criteria;skilled treatment is necessary  -LR     Therapy Frequency (PT) 2 times/day  -LR       Row Name 05/21/24 0859          Positioning and Restraints    Pre-Treatment Position in bed  -LR     Post Treatment Position chair  -LR     In Chair notified nsg;reclined;sitting;call light within reach;encouraged to call for assist;exit alarm on;with family/caregiver;legs elevated;compression device;waffle cushion  -LR               User Key  (r) = Recorded By, (t) = Taken By, (c) = Cosigned By      Initials Name Provider Type    LR Desirae Yee, PT Physical Therapist                    Outcome Measures       Row Name 05/21/24 1605 05/21/24 1400       How much help from another person do you currently need...    Turning from your back to your side while in flat bed without using bedrails? 4  -LR 3  -PT    Moving from lying on back to sitting on the side of a flat bed without bedrails? 3  -LR 3  -PT    Moving to and from a bed to a chair (including a wheelchair)? 3  -LR 3  -PT    Standing up from a chair using your arms (e.g., wheelchair, bedside chair)? 3  -LR 3  -PT    Climbing 3-5 steps with a railing? 3  -LR 2  -PT    To walk in hospital room? 3  -LR 2  -PT    AM-PAC 6 Clicks Score (PT) 19  -LR 16  -PT    Highest Level of Mobility Goal 6 --> Walk 10 steps or more  -LR 5 --> Static standing  -PT      Row Name 05/21/24 1605          PADD    Diagnosis 1  -LR     Gender 1  -LR     Age Group 2  -LR     Gait Distance 1  -LR     Assist Level 1  -LR     Home Support 3  -LR     PADD Score 9  -LR     Patient Preference home with home health  -LR     Prediction by PADD Score directly home (with home health or out-patient rehab)  -LR       Row Name 05/21/24 1605          Functional Assessment    Outcome Measure Options AM-PAC 6 Clicks Basic Mobility (PT);PADD  -LR               User Key  (r) = Recorded By, (t) = Taken By, (c) = Cosigned By      Initials Name Provider Type    LR Desirae Yee, PT Physical Therapist    PT Kira Kimbrough, RN Registered Nurse                                 Physical Therapy Education       Title: PT OT SLP Therapies (Done)       Topic: Physical Therapy (Done)       Point: Mobility training (Done)       Learning Progress Summary             Patient Acceptance, E,D,H, VU,DU by LR at 5/21/2024 1605    Comment: Issued/reviewed written/illustrated HEP. Educated on precautions,weight bearing status, safety with mobility,correct supine to sit t/f technique,correct sit<->stand t/f technique, correct gait mechanics, correct stair training technique, correct car  t/f.   Family Acceptance, E,D,H, VU,DU by LR at 5/21/2024 1605    Comment: Issued/reviewed written/illustrated HEP. Educated on precautions,weight bearing status, safety with mobility,correct supine to sit t/f technique,correct sit<->stand t/f technique, correct gait mechanics, correct stair training technique, correct car t/f.                         Point: Home exercise program (Done)       Learning Progress Summary             Patient Acceptance, E,D,H, VU,DU by LR at 5/21/2024 1605    Comment: Issued/reviewed written/illustrated HEP. Educated on precautions,weight bearing status, safety with mobility,correct supine to sit t/f technique,correct sit<->stand t/f technique, correct gait mechanics, correct stair training technique, correct car t/f.   Family Acceptance, E,D,H, VU,DU by LR at 5/21/2024 1605    Comment: Issued/reviewed written/illustrated HEP. Educated on precautions,weight bearing status, safety with mobility,correct supine to sit t/f technique,correct sit<->stand t/f technique, correct gait mechanics, correct stair training technique, correct car t/f.                         Point: Body mechanics (Done)       Learning Progress Summary             Patient Acceptance, E,D,H, VU,DU by LR at 5/21/2024 1605    Comment: Issued/reviewed written/illustrated HEP. Educated on precautions,weight bearing status, safety with mobility,correct supine to sit t/f technique,correct sit<->stand t/f technique, correct gait mechanics, correct stair training technique, correct car t/f.   Family Acceptance, E,D,H, VU,DU by LR at 5/21/2024 1605    Comment: Issued/reviewed written/illustrated HEP. Educated on precautions,weight bearing status, safety with mobility,correct supine to sit t/f technique,correct sit<->stand t/f technique, correct gait mechanics, correct stair training technique, correct car t/f.                         Point: Precautions (Done)       Learning Progress Summary             Patient Acceptance, E,D,H,  GARRICK ZAVALA by LR at 5/21/2024 1605    Comment: Issued/reviewed written/illustrated HEP. Educated on precautions,weight bearing status, safety with mobility,correct supine to sit t/f technique,correct sit<->stand t/f technique, correct gait mechanics, correct stair training technique, correct car t/f.   Family Acceptance, E,D,H, LUCAS,GARRICK by LR at 5/21/2024 1605    Comment: Issued/reviewed written/illustrated HEP. Educated on precautions,weight bearing status, safety with mobility,correct supine to sit t/f technique,correct sit<->stand t/f technique, correct gait mechanics, correct stair training technique, correct car t/f.                                         User Key       Initials Effective Dates Name Provider Type Discipline    LR 02/03/23 -  Desirae Yee, PT Physical Therapist PT                  PT Recommendation and Plan  Planned Therapy Interventions (PT): balance training, bed mobility training, gait training, home exercise program, ROM (range of motion), patient/family education, stair training, strengthening, stretching, transfer training  Plan of Care Reviewed With: patient, daughter  Progress: improving  Outcome Evaluation: Patient ambulated 200 feet with RW, CGA, step through gait pattern, limited by pain. One mild instance of L knee buckling with ambulation but no further buckling noted with progressive mobility and stair training. Patient climbed 3 steps with SPC and min assist via L HHA. Instructed patient and family on correct use of SPC and gait belt for stairs. L knee AROM limited today, lacking 14 degrees of extension and demonstrating 75 degrees of flexion AAROM in sitting. Patient currently below baseline functioning, demonstrating decreased functional mobility status and decreased R knee ROM/strength. Will address these deficits to promote return to PLOF. Recommend d/c home with assist and HHPT. Patient should be able to safely mobilize about the home upon d/c home today.     Time  Calculation:   PT Evaluation Complexity  History, PT Evaluation Complexity: 3 or more personal factors and/or comorbidities  Examination of Body Systems (PT Eval Complexity): total of 3 or more elements  Clinical Presentation (PT Evaluation Complexity): stable  Clinical Decision Making (PT Evaluation Complexity): low complexity  Overall Complexity (PT Evaluation Complexity): low complexity     PT Charges       Row Name 05/21/24 1605             Time Calculation    Start Time 1605  -LR      PT Received On 05/21/24  -LR      PT Goal Re-Cert Due Date 05/31/24  -LR         Timed Charges    33662 - PT Therapeutic Exercise Minutes 10  -LR      00340 - Gait Training Minutes  15  -LR         Untimed Charges    PT Eval/Re-eval Minutes 40  -LR         Total Minutes    Timed Charges Total Minutes 25  -LR      Untimed Charges Total Minutes 40  -LR       Total Minutes 65  -LR                User Key  (r) = Recorded By, (t) = Taken By, (c) = Cosigned By      Initials Name Provider Type    LR Desirae Yee, PT Physical Therapist                  Therapy Charges for Today       Code Description Service Date Service Provider Modifiers Qty    58734886686 HC PT THER PROC EA 15 MIN 5/21/2024 Desirae Yee, PT GP 1    94025230110 HC GAIT TRAINING EA 15 MIN 5/21/2024 Desirae Yee, PT GP 1    98236029065 HC PT EVAL LOW COMPLEXITY 3 5/21/2024 Desirae Yee, PT GP 1    38678932813 HC PT THER SUPP EA 15 MIN 5/21/2024 Desirae Yee, PT GP 2            PT G-Codes  Outcome Measure Options: AM-PAC 6 Clicks Basic Mobility (PT), PADD  AM-PAC 6 Clicks Score (PT): 19  PT Discharge Summary  Anticipated Discharge Disposition (PT): home with assist, home with home health    Desirae Yee, PT  5/21/2024

## 2024-05-21 NOTE — CASE MANAGEMENT/SOCIAL WORK
Continued Stay Note  [facility]     Patient Name: Marcia Hussein  MRN: [id number]  Today's Date: 5/21/2024    Admit Date: 5/21/2024    Plan: home with outpt PT   Discharge Plan       Row Name 05/21/24 1455       Plan    Plan home with outpt PT    Patient/Family in Agreement with Plan yes    Plan Comments Pt lives in [facility] and reports she was independent with ADLs and mobility prior to admit. She owns a rolling walker. Pt is followed by her PCP and has drug coverage. At this time her plan for discharge is to return home with assitance from daughter. She has a follow up appt for outpt PT with [facility].  No other dc needs at this time    Final Discharge Disposition Code 01 - home or self-care                   Discharge Codes    No documentation.                       Kylee Adame RN

## 2024-05-21 NOTE — DISCHARGE INSTRUCTIONS
InfuBLOCK - Patient Information    What is a pain pump?  The InfuBLOCK pump delivers post-operative, non-narcotic, numbing medication to the nerve near the surgical site for pain relief.     Where can I find information about my pain pump?           For more information about your pain pump, scan the QR code.  For additional patient resources, visit [email or web address].                                                                                               While your physician is your primary source for information about your treatment there may be times during your treatment that you need assistance with your infusion pump.     If you need assistance take the following steps:    The [facility] Hotline is Here for You 24/7.  Please call [phone] for the following concerns or complications:    Answers to questions about your infusion pump                 Tubing disconnect  Assistance with pump alarms                                                      Dislodged catheter  Excessive leakage noted from pump                                         Inadequate pain control    2.   [facility] Anesthesia Acute Pain Service: [phone] is available 24/7 for any further needs or concerns about medication or pain control.     -------------------------------------------------------------------------    Nerve Catheter Removal Instructions  When your device is empty:    Remove your catheter by pulling the dressing off slowly (like you would remove a regular bandage). The catheter should pull right out of the skin.  Check that the BLUE tip is intact.                                                                                     If the catheter is stuck, reposition your   extremity and pull slowly until removed.  *If catheter is HURTING and WON'T come out, stop and call [phone] for further assistance.    Remove medication bag from the black carrying case.  Cut the  tubing on right and left side of pump, and discard the medication bag and tubing into garbage.  Place the pump and black carrying case into the plastic bag and then place this into the return box.  Seal box with blue stickers and return to [employer] postal service. THIS IS PRE-PAID POSTAGE.        -------------------------------------------------------------------------    [facility] COLD THERAPY - PATIENT INSTRUCTION SHEET    Cold Compression Therapy for your comfort and rehabilitation  Your caregivers want you to be productive in your rehab and comfortable during your stay. In keeping with those goals, you will be receiving an SMI Cold Therapy Wrap to help ease post-operative pain and swelling that might keep you from getting back on track! Your SMI Cold Therapy Wrap is effective and simple-to-use, and you will be encouraged to apply it throughout your hospital stay and at home through the duration of your recovery.    When you are ready to go home  Be sure to take your SMI Cold Therapy Wrap and both sets of Gel Bags with you for continued comfort and use throughout your rehabilitation. If you don't already have them, ask your nurse or aide to retrieve your SMI Gel Bags from the patient freezer.    Home use precautions  Always follow your medical professional's application instructions upon discharge. Your SMI Cold Therapy Wrap and Gel Bags are designed to last for months following your surgery. Never heat the Gel Bags unless specified by your healthcare provider. Supervision is advised when using this product on children or geriatric patients. To avoid danger of suffocation, please keep the outer plastic packaging away from children & pets.    Cold Therapy Instructions  Place Gel Bags in a freezer set ¾ of the way to max temperature for at least (4) hours. For best results, lay the Gel Bags flat and sual-so-dgyu in the freezer. Once frozen, slide Gel Bags into the gel pouch and secure your wrap to the affected area with the  straps.  Gel wraps that have been stored in a freezer for an extended period of time may require a (10) minute period of softening up in a room temperature environment before application.  The gel pouch acts as a protective barrier. NEVER place frozen bags directly onto skin, as this may cause frostbite injury.  The [facility] Cold Therapy Wrap is designed to be able to be worm while ambulating. The compression straps can be secured well enough so that the Wrap won't fall off while moving.  Wrap Application Videos can be viewed at [email or web address].  An additional protective barrier such as clothing, a washcloth, hand-towel or pillowcase may be used during prolonged treatment applications.  The Gel-Pouch and Wrap are both Latex-Free and the Gel Bag ingredients are non toxic.    [facility] Wrap care instructions  The [facility] Cold Therapy Wrap may be hand washed and hung to dry when needed.    [facility] re-order information  Additional [facility] body specific wraps and/or Gel Bags can be re-ordered from [employer] or call [employer] ([phone])

## 2024-05-21 NOTE — H&P
Patient Name: Marcia Hussein  MRN: [id number]  : 1960  DOS: 2024    Attending: Blake Bradley MD    Primary Care Provider: Heather Justice MD      Chief complaint: Left knee Pain.    Subjective   Patient is a 63 y.o.female presents for scheduled surgery by Dr. Bradley.  Has been painful for many years.  She denies use of assistive device for ambulation or recent falls.  Conservative treatments failed to provide lasting benefits.   She underwent a TOTAL KNEE ARTHROPLASTY WITH CORI ROBOT - LEFT  today.   Patient feels fine, denies no fever or chills, no chest pain or shortness of breath    Allergies:  No Known Allergies    Meds:  Medications Prior to Admission   Medication Sig Dispense Refill Last Dose    Chlorhexidine Gluconate 4 % solution Apply topically to the appropriate area as directed Daily 5 days prior to surgery. 237 mL 0 2024    citalopram (CeleXA) 20 MG tablet Take 1 tablet by mouth Daily.   2024    famotidine (PEPCID) 20 MG tablet Take 1 tablet by mouth Daily As Needed for Indigestion or Heartburn.   2024    losartan (COZAAR) 25 MG tablet Take 1 tablet by mouth Daily.   2024    metFORMIN ER (GLUCOPHAGE-XR) 500 MG 24 hr tablet Take 1 tablet by mouth Daily.   2024    meloxicam (MOBIC) 7.5 MG tablet 1 Oral Daily with food. 30 tablet 1 More than a month    Wegovy 0.25 MG/0.5ML solution auto-injector Pt to hold 1 week prior to procedure   2024    zolpidem (AMBIEN) 10 MG tablet Take 0.5 tablets by mouth Every Night.   2024         History:   Past Medical History:   Diagnosis Date    Anxiety     Arthritis     Diabetes mellitus     GERD (gastroesophageal reflux disease)     Hypertension     Knee swelling Dec 2021    Periarthritis of shoulder not noted    Rotator cuff syndrome      Past Surgical History:   Procedure Laterality Date    COLONOSCOPY      ELBOW PROCEDURE Left     orif    SHOULDER SURGERY Right     frozen shoulder     Family History  "  Problem Relation Age of Onset    Breast cancer Sister 40    No Known Problems Mother     Diabetes Father     Hypertension Father     Ovarian cancer Neg Hx      Social History     Tobacco Use    Smoking status: Former     Current packs/day: 0.00     Types: Cigarettes     Start date: 1975     Quit date: 1983     Years since quittin.4    Smokeless tobacco: Never   Vaping Use    Vaping status: Never Used   Substance Use Topics    Alcohol use: Yes     Alcohol/week: 5.0 standard drinks of alcohol     Types: 5 Glasses of wine per week    Drug use: Never       Review of Systems  Pertinent items are noted in HPI, all other systems reviewed and negative    Vital Signs  /70   Pulse 59   Temp 97.5 °F (36.4 °C) (Temporal)   Resp 14   Ht 160 cm (63\")   Wt 73.9 kg (163 lb)   SpO2 94%   BMI 28.87 kg/m²     Physical Exam:    General Appearance:    Alert, cooperative, in no acute distress   Head:    Normocephalic, without obvious abnormality, atraumatic   Eyes:            Lids and lashes normal, conjunctivae and sclerae normal, no   icterus, no pallor, corneas clear    Ears:    Ears appear intact with no abnormalities noted   Throat:   No oral lesions, no thrush, oral mucosa moist   Neck:   No adenopathy, supple, trachea midline, no thyromegaly         Lungs:     Clear to auscultation,respirations regular, even and                   unlabored    Heart:    Regular rhythm and normal rate, normal S1 and S2, no       murmur, no gallop   Abdomen:     Normal bowel sounds, no masses, no organomegaly, soft        non-tender, non-distended, no guarding, no rebound                 tenderness   Genitalia:    Deferred   Extremities: Left LE, CDI dressing on knee, PNB cath present.    Pulses:   Pulses palpable and equal bilaterally   Skin:   No bleeding, bruising or rash   Neurologic:   Cranial nerves 2 - 12 grossly intact, no focal weakness      I reviewed the patient's new clinical results.             Invalid " "input(s): \"NEUTOPHILPCT\"        Invalid input(s): \"LABALBU\", \"PROT\"  Lab Results   Component Value Date    HGBA1C 5.40 05/07/2024           Assessment and Plan:       Primary osteoarthritis of left knee    Degenerative arthritis of right knee      Plan  1. PT/OT,  Weight bearing as tolerated left LE  2. Pain control-prns, ACB cath with ropivacaine infusion.  3. IS-encourage  4. DVT proph- Mechanicals and aspirin twice daily  5. Bowel regimen  6. Resume home medications as appropriate  7. Monitor post-op labs  8. DC planning for home today if she cleared by PT    Hypertension  Continue losartan  Monitor blood pressure closely    Anxiety and depression  Continue Celexa    Patient will be discharged today if she is cleared by PT  Her discharge medication where reconciliate it and prescription sent to the pharmacy    Dragon disclaimer:  Part of this encounter note is an electronic transcription/translation of spoken language to printed text. The electronic translation of spoken language may permit erroneous, or at times, nonsensical words or phrases to be inadvertently transcribed; Although I have reviewed the note for such errors, some may still exist.    Art De La Garza MD  05/21/24  14:27 EDT         "

## 2024-05-21 NOTE — ANESTHESIA POSTPROCEDURE EVALUATION
"Patient: Marcia Hussein    Procedure Summary       Date: 05/21/24 Room / Location:  [facility]  [facility]    Anesthesia Start: 1017 Anesthesia Stop: 1215    Procedure: TOTAL KNEE ARTHROPLASTY WITH CORI ROBOT - LEFT (Left: Knee) Diagnosis:       Primary osteoarthritis of left knee      (Primary osteoarthritis of left knee [M17.12])    Surgeons: Blake Bradley MD Provider: Jennifer Carballo DO    Anesthesia Type: spinal ASA Status: 2            Anesthesia Type: spinal    Vitals  No vitals data found for the desired time range.          Post Anesthesia Care and Evaluation    Patient location during evaluation: PACU  Patient participation: complete - patient participated  Level of consciousness: awake and responsive to verbal stimuli  Pain score: 2  Pain management: adequate    Airway patency: patent  Anesthetic complications: No anesthetic complications    Cardiovascular status: acceptable  Respiratory status: acceptable  Hydration status: acceptable    Comments: Pt awake and responsive. SV. VSS. Report to RN. Patient Vitals in the past 24 hrs:  05/21/24 0853, BP:145/90, Temp:98.2 °F (36.8 °C), Temp src:Temporal, Pulse:83, Resp:16, SpO2:97 %, Height:160 cm (63\"), Weight:73.9 kg (163 lb)  133/78. p 72. r 16. t 98.1                "

## 2024-05-21 NOTE — ANESTHESIA PROCEDURE NOTES
Spinal Block      Patient reassessed immediately prior to procedure    Patient location during procedure: OR  Indication:at surgeon's request  Performed By  CRNA/CAA: Ajit Slater CRNA  Preanesthetic Checklist  Completed: patient identified, IV checked, site marked, risks and benefits discussed, surgical consent, monitors and equipment checked, pre-op evaluation and timeout performed  Spinal Block Prep:  Patient Position:sitting  Sterile Tech:cap, gloves, sterile barriers and mask  Prep:Chloraprep  Patient Monitoring:blood pressure monitoring, continuous pulse oximetry and EKG    Spinal Block Procedure  Approach:midline  Guidance:landmark technique and palpation technique  Location:L4-L5  Needle Type:Devorah  Needle Gauge:25 G  Placement of Spinal needle event:cerebrospinal fluid aspirated  Paresthesia: no  Fluid Appearance:clear  Medications: bupivacaine (MARCAINE) 0.5 % injection - Injection   1.8 mL - 5/21/2024 10:28:00 AM   Post Assessment  Patient Tolerance:patient tolerated the procedure well with no apparent complications  Complications no  Additional Notes  Procedure:  Pt assisted to sitting position, with legs in position of comfort over side of bed.  Pt. instructed in optimal spine presentation, the spine was prepped/ Draped and the skin at insertion site was anesthetized with 1% Lidocaine 2 ml.  The spinal needle was then advanced until CSF flow was obtained and LA was injected:

## 2024-05-21 NOTE — ANESTHESIA PROCEDURE NOTES
Peripheral Block      Patient reassessed immediately prior to procedure    Reason for block: at surgeon's request and post-op pain management  Performed by  CRNA/CAA: Asad Wong, GINA  Preanesthetic Checklist  Completed: patient identified, IV checked, site marked, risks and benefits discussed, surgical consent, monitors and equipment checked, pre-op evaluation and timeout performed  Prep:  Pt Position: supine  Sterile barriers:cap, gloves, mask, sterile barriers and washed/disinfected hands  Prep: ChloraPrep  Patient monitoring: blood pressure monitoring, continuous pulse oximetry and EKG  Procedure  Performed under: spinal  Guidance:ultrasound guided    ULTRASOUND INTERPRETATION.  Using ultrasound guidance a 20 G gauge needle was placed in close proximity to the nerve, at which point, under ultrasound guidance anesthetic was injected in the area of the nerve and spread of the anesthesia was seen on ultrasound in close proximity thereto.  There were no abnormalities seen on ultrasound; a digital image was taken; and the patient tolerated the procedure with no complications. Images:still images obtained, printed/placed on chart    Laterality:left  Block Type:adductor canal block  Injection Technique:catheter  Needle Type:Tuohy and echogenic  Needle Gauge:18 G  Resistance on Injection: none  Catheter Size:20 G (20g)  Cath Depth at skin: 10 cm    Medications Used: bupivacaine PF (MARCAINE) 0.25 % injection - Injection   20 mL - 5/21/2024 12:15:00 PM      Post Assessment  Injection Assessment: negative aspiration for heme, incremental injection and no paresthesia on injection  Patient Tolerance:comfortable throughout block  Complications:no  Additional Notes  CATHETER   A high-frequency linear transducer, with sterile cover, was placed on the anterior mid-thigh (between the anterior superior iliac spine and patella). The transducer was then moved medially to identify the Sartorius muscle (Ava), Vastus Medialis muscle  "(VMM), Superficial Femoral Artery (SFA) and Vein. The transducer was then moved cephalad or caudad to position the SFA in the middle of the Ava. The insertion site was prepped and draped in sterile fashion. Skin and cutaneous tissue was infiltrated with 2-5 ml of 1% Lidocaine. Using ultrasound-guidance, an 18-gauge Muzuiiplex Ultra 360 Touhy needle was advanced in plane from lateral to medial. Preservative-free normal saline was utilized for hydro-dissection of tissue, advancement of Touhy, and to confirm needle placement below the fascial plane of the Ava where the Nerve to the VMM is located. Local anesthetic (LA) 5 ml deposited here. The Touhy needle continues its path lateral to the SFA at the level of the Saphenous Nerve. The remainder of the LA was deposited at the 10-11 o'clock position of the SFA. This injection created a space between the Ava and the SFA. Aspiration every 5 ml to prevent intravascular injection. Injection was completed with negative aspiration of blood and negative intravascular injection. Injection pressures were normal with minimal resistance. A 20-gauge Muzuiiplex Echo catheter was placed through the needle and advance out the tip of the Touhy 3-5 cm anterior to the SFA. The Touhy needle was then removed, and final catheter position verified at the 12 o'clock position to the SFA. The catheter was secured in the usual fashion with skin glue, benzoin, steri-strips, CHG tegaderm and label noting \"Nerve Block Catheter\". Jerk tape applied at yellow connector and catheter connection.             "

## 2024-05-22 ENCOUNTER — TRANSITIONAL CARE MANAGEMENT TELEPHONE ENCOUNTER (OUTPATIENT)
Dept: ORTHOPEDIC SURGERY | Facility: CLINIC | Age: 64
End: 2024-05-22
Payer: COMMERCIAL

## 2024-05-22 NOTE — OUTREACH NOTE
Patient: Marcia Hussein    : 1960    Age/Gender: 63 y.o. female    Surgeon: RANDELL    Surgical Procedure: RT TKA    Surgery Date: 24    Discharge Date: 24        How have you been doing since discharge? DOING OK. JUST FINISHED MY FIRST PT SESSION    2.  Are you able to eat/drink? YES    3. Are you having any nausea or vomiting? NO    4. Are you having any problems with your bowels, or passing gas? NO    5. Are you able to get around and walk? YES    6. Do you have any pain? YES    7. Pain rating at rest: 7.5/10    8. Pain rating while walkin.5/10    9. Have you completed your first session of physical therapy? YES, THIS HAS CHANGED TO THE [facility] HOME REHAB PROGRAM    10. Were all your medications, wound care instructions, and  follow up appointments discussed with you prior to discharge? YES    11. Discharge Location: HOME      Additional Comments: PHYSICAL THERAPY HAS BEEN CHANGED BY THE PATIENT TO THE [facility] HOME REHAB PROGRAM.

## 2024-05-23 ENCOUNTER — TELEPHONE (OUTPATIENT)
Dept: ORTHOPEDIC SURGERY | Facility: CLINIC | Age: 64
End: 2024-05-23
Payer: COMMERCIAL

## 2024-05-23 NOTE — TELEPHONE ENCOUNTER
Caller: Marcia Hussein    Relationship: Self    Best call back number: [phone]    What is the best time to reach you: ANY     Who are you requesting to speak with (clinical staff, provider,  specific staff member): CLINICAL     Do you know the name of the person who called: YES     What was the call regarding:PATIENT HAS SOME QUESTIONS REGARDING BANDAGES FROM SURGERY 05/21/2024

## 2024-05-23 NOTE — TELEPHONE ENCOUNTER
"Spoke to pt who just wanted to confirm that she was correct in thinking that she was able to remove her post-op dressing today; I told her this was correct but just to leave her \"clear tape\" dressing in place until post-op. She verbalized understanding. We also confirmed her post-op appt for [facility] for 6/12. No further action needed at this time.    Keli KAUFFMAN CMA ([facility]), ROT    "

## 2024-05-29 ENCOUNTER — TELEPHONE (OUTPATIENT)
Dept: ORTHOPEDIC SURGERY | Facility: CLINIC | Age: 64
End: 2024-05-29
Payer: COMMERCIAL

## 2024-05-31 ENCOUNTER — TELEPHONE (OUTPATIENT)
Age: 64
End: 2024-05-31
Payer: COMMERCIAL

## 2024-05-31 DIAGNOSIS — M17.11 PRIMARY OSTEOARTHRITIS OF RIGHT KNEE: ICD-10-CM

## 2024-05-31 RX ORDER — OXYCODONE HYDROCHLORIDE 5 MG/1
5 TABLET ORAL EVERY 6 HOURS PRN
Qty: 30 TABLET | Refills: 0 | Status: SHIPPED | OUTPATIENT
Start: 2024-05-31 | End: 2024-05-31 | Stop reason: SDUPTHER

## 2024-05-31 RX ORDER — OXYCODONE HYDROCHLORIDE 5 MG/1
5 TABLET ORAL EVERY 6 HOURS PRN
Qty: 30 TABLET | Refills: 0 | Status: SHIPPED | OUTPATIENT
Start: 2024-05-31

## 2024-05-31 NOTE — TELEPHONE ENCOUNTER
Patient had sx 5/21/24 and is currently on Oxycodone. Tylenol extra strength doesn't help much. Patient is requesting another prescription for throughout the day.       [facility], [facility]. - [place], [place] - [facility] [address] - [phone] PH -

## 2024-05-31 NOTE — TELEPHONE ENCOUNTER
Called patient to let her know that her oxycodone was sent in. Patient said that's good cause she uses it at night but she called in earlier because she really wanted something between tylenol extra strength and oxycodone so she can use it for therapy. She said PT told her about a medication but she was unsure of the name. Said tylenol doesn't help and oxycodone makes her drowsy so if there is something stronger than tylenol she can take that wont be as strong as the oxycodone or make her drowsy. Please advise, thank you.         Codie Dhaliwal CMA ([facility])

## 2024-05-31 NOTE — TELEPHONE ENCOUNTER
Patient had left knee total arthroplasty on 5/21/24. She was prescribed Oxycodone then, no refill until now. Patient is requesting a refill. Please advise, thank you.    Codie Dhaliwal CMA ([facility])

## 2024-05-31 NOTE — TELEPHONE ENCOUNTER
Patient needs Tramadol sent to pharmacy.   She already has Oxy that she takes at night. Tramadol is being requested for day time      [facility], [facility]. - [place], [place] - [facility] [address]. - [phone] PH   Thank you,

## 2024-06-03 RX ORDER — TRAMADOL HYDROCHLORIDE 50 MG/1
50 TABLET ORAL EVERY 8 HOURS PRN
Qty: 30 TABLET | Refills: 0 | Status: SHIPPED | OUTPATIENT
Start: 2024-06-03

## 2024-06-03 NOTE — TELEPHONE ENCOUNTER
Called patient and let her know the Tramadol was sent in to the pharmacy. Told patient to let us know if she needs anything else. Patient was thankful and showed understanding.      Codie Dhaliwal CMA ([facility])

## 2024-06-12 ENCOUNTER — OFFICE VISIT (OUTPATIENT)
Dept: ORTHOPEDIC SURGERY | Facility: CLINIC | Age: 64
End: 2024-06-12
Payer: COMMERCIAL

## 2024-06-12 DIAGNOSIS — Z96.652 STATUS POST TOTAL LEFT KNEE REPLACEMENT: Primary | ICD-10-CM

## 2024-06-12 PROCEDURE — 99024 POSTOP FOLLOW-UP VISIT: CPT | Performed by: PHYSICIAN ASSISTANT

## 2024-06-12 NOTE — PROGRESS NOTES
[facility] Note        Subjective     CC: Post-op (3 weeks S/P TOTAL KNEE ARTHROPLASTY WITH CORI ROBOT - LEFT (DOS 05/21/2024))      BROOK Hussein is a 63 y.o. female.  Patient presents today 3 weeks status post left total knee arthroplasty with Dr. Bradley.  She reports she is still having some stiffness.  She is doing outpatient physical therapy.  She is working diligently on motion.  She is been taking oxycodone at night to help with the pain.  She has been having some restless leg symptoms at night.  Using a cane for ambulation.    Overall, patient's symptoms are improving    ROS:    Constiutional:Pt denies fever, chills, nausea, or vomiting.  MSK:as above        Objective      Past Medical History  Past Medical History:   Diagnosis Date    Anxiety     Arthritis     Diabetes mellitus     GERD (gastroesophageal reflux disease)     Hypertension     Knee swelling Dec 2021    Periarthritis of shoulder not noted    Rotator cuff syndrome 2022         Physical Exam  There were no vitals taken for this visit.    There is no height or weight on file to calculate BMI.    Patient is well nourished and well developed.        Ortho Exam  Left knee exam: Anterior knee incision is healing well.  Range of motion 5-90.  Ligament stable valgus varus stress.  Neurovascular intact distally.    Imaging/Labs/EMG Reviewed:  Left Knee Radiographs  Indication: status-post left total knee arthroplasty  Views: AP, lateral, and sunrise views of the left knee     Comparison: no change compared to prior study, 5/21/2024     Findings:   The components are well aligned, with no signs of loosening or failure.         Assessment    Assessment:  1. Status post total left knee replacement        Plan:  Recommend over the counter anti-inflammatories for pain and/or swelling  Doing well status post left total knee arthroplasty with Dr. Bradley on 5/21/2024.  Patient is having improved motion and pain.  She is doing  physical therapy outpatient I encouraged her to aggressively work on her motion.  Patient will continue with the Tylenol and oxycodone at night.  She will return to see Dr. Bradley in 6 weeks, sooner if needed.    Patient history, diagnosis and treatment plan discussed with Dr. Bradley.      Nette Jimenez PA-C  06/14/24  10:35 EDT      Answers submitted by the patient for this visit:  Other (Submitted on 6/10/2024)  Please describe your symptoms.: Surgery follow up  Have you had these symptoms before?: No  How long have you been having these symptoms?: Greater than 2 weeks  Please list any medications you are currently taking for this condition.: None  Please describe any probable cause for these symptoms. : Surgery  Primary Reason for Visit (Submitted on 6/10/2024)  What is the primary reason for your visit?: Other

## 2024-06-20 DIAGNOSIS — M17.11 PRIMARY OSTEOARTHRITIS OF RIGHT KNEE: ICD-10-CM

## 2024-06-20 RX ORDER — TRAMADOL HYDROCHLORIDE 50 MG/1
50 TABLET ORAL EVERY 8 HOURS PRN
Qty: 30 TABLET | Refills: 0 | Status: SHIPPED | OUTPATIENT
Start: 2024-06-20

## 2024-06-20 RX ORDER — OXYCODONE HYDROCHLORIDE 5 MG/1
5 TABLET ORAL EVERY 6 HOURS PRN
Qty: 30 TABLET | Refills: 0 | Status: CANCELLED | OUTPATIENT
Start: 2024-06-20

## 2024-06-20 NOTE — TELEPHONE ENCOUNTER
Patient called stating she needs a refill of her medication of traMadol and oxyCodone.    Please advise, thank you

## 2024-06-20 NOTE — TELEPHONE ENCOUNTER
Left voicemail for patient to return call.       Hub okay to relay that  refilled the Tramadol but he denied refilling Oxycodone.     Codie Dhaliwal CMA ([facility])

## 2024-06-20 NOTE — TELEPHONE ENCOUNTER
Patient had left total knee arthroplasty on 5/21/24. Patient is requesting a refill of her Tramadol and Oxycodone.  Please advise, thank you.      Codie Dhaliwal CMA ([facility])

## 2024-07-03 ENCOUNTER — TELEPHONE (OUTPATIENT)
Dept: ORTHOPEDIC SURGERY | Facility: CLINIC | Age: 64
End: 2024-07-03
Payer: COMMERCIAL

## 2024-07-03 DIAGNOSIS — M17.11 PRIMARY OSTEOARTHRITIS OF RIGHT KNEE: ICD-10-CM

## 2024-07-03 RX ORDER — OXYCODONE HYDROCHLORIDE 5 MG/1
5 TABLET ORAL EVERY 6 HOURS PRN
Qty: 30 TABLET | Refills: 0 | Status: CANCELLED | OUTPATIENT
Start: 2024-07-03

## 2024-07-03 NOTE — TELEPHONE ENCOUNTER
Phone call to pt.  She does not need the tramadol as she just got a refill of it.  She said that she will take that at night.

## 2024-07-03 NOTE — TELEPHONE ENCOUNTER
PATIENT CALLING WANTING TO SEE IF HER OXYCODONE REFILL COULD BE SENT TO [facility]

## 2024-07-08 ENCOUNTER — OFFICE VISIT (OUTPATIENT)
Age: 64
End: 2024-07-08
Payer: COMMERCIAL

## 2024-07-08 ENCOUNTER — TELEPHONE (OUTPATIENT)
Dept: ORTHOPEDIC SURGERY | Facility: CLINIC | Age: 64
End: 2024-07-08
Payer: COMMERCIAL

## 2024-07-08 DIAGNOSIS — Z96.652 STATUS POST TOTAL LEFT KNEE REPLACEMENT: Primary | ICD-10-CM

## 2024-07-08 PROCEDURE — 99024 POSTOP FOLLOW-UP VISIT: CPT | Performed by: PHYSICIAN ASSISTANT

## 2024-07-08 RX ORDER — MELOXICAM 15 MG/1
15 TABLET ORAL DAILY
Qty: 30 TABLET | Refills: 2 | Status: SHIPPED | OUTPATIENT
Start: 2024-07-08 | End: 2024-10-06

## 2024-07-08 NOTE — TELEPHONE ENCOUNTER
Patient states she was seen this morning and Nette was going to call in an anti - inflammatory, begins with M and once a day- patient would like this to be sent to [facility].     [facility] - [phone]

## 2024-07-08 NOTE — PROGRESS NOTES
[facility] Note      Subjective     CC: Post-op (3.5 week follow-up--7 weeks S/P TOTAL KNEE ARTHROPLASTY WITH CORI ROBOT - LEFT (DOS 05/21/2024))      BROOK Hussein is a 63 y.o. female.  Patient returns early today with concerns of her progress status post left total knee arthroplasty on 5/21/2024.  Patient reports she continues to have pain.  It is intermittent at this time and not constant.  She continues to have difficulty sleeping.  She has been very diligent with physical therapy and at home exercises.  She reports that some days she will sit down and then he will get extremely stiff and be difficult to move.  She is upset with the extension at this point and feels that should be should be further along than she is is far out of surgery.  Patient reports that prior to surgery she was very stiff.  She has been icing as well as taking occasional tramadol.  Not taking any anti-inflammatories.  Here because she is frustrated thinking she would be further along        ROS:    Constiutional:Pt denies fever, chills, nausea, or vomiting.  MSK:as above        Objective      Past Medical History  Past Medical History:   Diagnosis Date    Anxiety     Arthritis     Diabetes mellitus     GERD (gastroesophageal reflux disease)     Hypertension     Knee swelling Dec 2021    Periarthritis of shoulder not noted    Rotator cuff syndrome 2022         Physical Exam  There were no vitals taken for this visit.    There is no height or weight on file to calculate BMI.    Patient is well nourished and well developed.        Ortho Exam  Left knee exam: Anterior knee incision is well-healed range of motion 5-110.  Ligament stable to valgus and varus stress.  Neurovascular intact distally.  Using a cane for ambulation.    Imaging/Labs/EMG Reviewed:  Imaging Results (Last 24 Hours)       ** No results found for the last 24 hours. **              Assessment    Assessment:  1. Status post total left  knee replacement        Plan:  Recommend over the counter anti-inflammatories for pain and/or swelling  General status post left total knee arthroplasty with Dr. Bradley on 5/21/2024.  Patient returns early due to frustration of her progress.  She reports that physical therapy has measured her motion at lacking 5 degrees extension and has gotten her up to 117 flexion.  She continues to have pain intermittently that sometimes makes it difficult to walk as well as difficult sleeping sometimes.  Her motion prior to surgery was 15-95.  I explained to her that her stiffness prior to surgery will make it more difficult for her to regain full motion postoperatively and she may be slower than some.  She is not quite 2 months out of surgery at this point and her motion will continue to improve as well as the pain.  I have given her prescription for meloxicam to add to her regimen recommended she continue with icing as well as motion.  She has back in her pool now and I encouraged her to work on motion in the pool as well.  Patient will still frustrated and feels better at the end of the visit about her progress then when she came in.  She will return to see Dr. Bradley as scheduled, sooner if needed.  She has no fever chills, swelling or any concern for infection about the knee.    Nette Jimenez PA-C  07/09/24  13:19 EDT      Dragon disclaimer:  Much of this encounter note is an electronic transcription/translation of spoken language to printed text. The electronic translation of spoken language may permit erroneous, or at times, nonsensical words or phrases to be inadvertently transcribed; Although I have reviewed the note for such errors, some may still exist.

## 2024-07-08 NOTE — TELEPHONE ENCOUNTER
I spoke with patient and told her Nette was sending as we speak and to check with Jesús's in about :15.  She had no further questions.    Esperanza MATTA) ROT

## 2024-07-29 ENCOUNTER — OFFICE VISIT (OUTPATIENT)
Dept: ORTHOPEDIC SURGERY | Facility: CLINIC | Age: 64
End: 2024-07-29
Payer: COMMERCIAL

## 2024-07-29 DIAGNOSIS — Z47.89 ORTHOPEDIC AFTERCARE: ICD-10-CM

## 2024-07-29 DIAGNOSIS — Z96.652 STATUS POST TOTAL LEFT KNEE REPLACEMENT: Primary | ICD-10-CM

## 2024-07-29 PROCEDURE — 99024 POSTOP FOLLOW-UP VISIT: CPT | Performed by: ORTHOPAEDIC SURGERY

## 2024-07-29 RX ORDER — MELOXICAM 15 MG/1
15 TABLET ORAL DAILY
Qty: 30 TABLET | Refills: 0 | Status: SHIPPED | OUTPATIENT
Start: 2024-07-29 | End: 2024-08-05 | Stop reason: SDUPTHER

## 2024-07-29 RX ORDER — TRAMADOL HYDROCHLORIDE 50 MG/1
50 TABLET ORAL EVERY 12 HOURS PRN
Qty: 25 TABLET | Refills: 0 | Status: SHIPPED | OUTPATIENT
Start: 2024-07-29

## 2024-07-29 NOTE — PROGRESS NOTES
[facility]    Subjective     Chief Complaint   Patient presents with    Post-op     3 week follow up -- 2 months S/P TOTAL KNEE ARTHROPLASTY WITH CORI ROBOT - LEFT (DOS 05/21/2024)        HPI    It has been 3  week(s) since Ms. Hussein's last visit. She returns to clinic today for postoperative follow-up of left knee arthroplasty. The issue has been ongoing for 2 month(s). She rates her pain a 3/10 on the pain scale. Previous/current treatments: NSAIDS. Current symptoms: stiffness. The pain is worse with walking; resting and pain medication and/or NSAID improve the pain. Overall, she is doing better.  80 to 85% improvement compared to preoperative symptoms.  Fully ambulatory without external aids.  Making improvements with therapy.    I have reviewed the following portions of the patient's history and agree with: History of Present Illness and Review of Systems    Patient Active Problem List   Diagnosis    Degenerative arthritis of right knee    Primary osteoarthritis of left knee     Past Medical History:   Diagnosis Date    Anxiety     Arthritis     Diabetes mellitus     GERD (gastroesophageal reflux disease)     Hypertension     Knee swelling Dec 2021    Periarthritis of shoulder not noted    Rotator cuff syndrome 2022      Past Surgical History:   Procedure Laterality Date    COLONOSCOPY      ELBOW PROCEDURE Left 2015    orif    SHOULDER SURGERY Right 2012    frozen shoulder    TOTAL KNEE ARTHROPLASTY Left 5/21/2024    Procedure: TOTAL KNEE ARTHROPLASTY WITH CORI ROBOT - LEFT;  Surgeon: Blake Bradley MD;  Location: [facility];  Service: Robotics - Ortho;  Laterality: Left;      Family History   Problem Relation Age of Onset    Breast cancer Sister 40    No Known Problems Mother     Diabetes Father     Hypertension Father     Ovarian cancer Neg Hx      Social History     Socioeconomic History    Marital status:    Tobacco Use    Smoking status: Former     Current packs/day:  0.00     Types: Cigarettes     Start date: 1975     Quit date: 1983     Years since quittin.6    Smokeless tobacco: Never   Vaping Use    Vaping status: Never Used   Substance and Sexual Activity    Alcohol use: Yes     Alcohol/week: 5.0 standard drinks of alcohol     Types: 5 Glasses of wine per week    Drug use: Never    Sexual activity: Defer      Current Outpatient Medications on File Prior to Visit   Medication Sig Dispense Refill    aspirin (ASPIR) 81 MG EC tablet Take 1 tablet by mouth 2 (Two) Times a Day. 60 tablet 0    citalopram (CeleXA) 20 MG tablet Take 1 tablet by mouth Daily.      famotidine (PEPCID) 20 MG tablet Take 1 tablet by mouth Daily As Needed for Indigestion or Heartburn.      losartan (COZAAR) 25 MG tablet Take 1 tablet by mouth Daily.      metFORMIN ER (GLUCOPHAGE-XR) 500 MG 24 hr tablet Take 1 tablet by mouth Daily.      oxyCODONE (Roxicodone) 5 MG immediate release tablet Take 1 tablet by mouth Every 6 (Six) Hours As Needed for Moderate Pain. 30 tablet 0    traMADol (ULTRAM) 50 MG tablet Take 1 tablet by mouth Every 8 (Eight) Hours As Needed for Moderate Pain. 30 tablet 0    Wegovy 0.25 MG/0.5ML solution auto-injector Pt to hold 1 week prior to procedure      zolpidem (AMBIEN) 10 MG tablet Take 0.5 tablets by mouth Every Night.      [DISCONTINUED] meloxicam (MOBIC) 15 MG tablet Take 1 tablet by mouth Daily for 90 days. 30 tablet 2    [DISCONTINUED] meloxicam (MOBIC) 7.5 MG tablet 1 Oral Daily with food. 30 tablet 1     No current facility-administered medications on file prior to visit.      No Known Allergies     Review of Systems   Constitutional:  Negative for activity change, appetite change, chills, diaphoresis, fatigue, fever and unexpected weight change.   HENT:  Negative for congestion, dental problem, drooling, ear discharge, ear pain, facial swelling, hearing loss, mouth sores, nosebleeds, postnasal drip, rhinorrhea, sinus pressure, sneezing, sore throat, tinnitus,  trouble swallowing and voice change.    Eyes:  Negative for photophobia, pain, discharge, redness, itching and visual disturbance.   Respiratory:  Negative for apnea, cough, choking, chest tightness, shortness of breath, wheezing and stridor.    Cardiovascular:  Negative for chest pain, palpitations and leg swelling.   Gastrointestinal:  Negative for abdominal distention, abdominal pain, anal bleeding, blood in stool, constipation, diarrhea, nausea, rectal pain and vomiting.   Endocrine: Negative for cold intolerance, heat intolerance, polydipsia, polyphagia and polyuria.   Genitourinary:  Negative for decreased urine volume, difficulty urinating, dysuria, enuresis, flank pain, frequency, genital sores, hematuria and urgency.   Musculoskeletal:  Positive for arthralgias. Negative for back pain, gait problem, joint swelling, myalgias, neck pain and neck stiffness.   Skin:  Negative for color change, pallor, rash and wound.   Allergic/Immunologic: Negative for environmental allergies, food allergies and immunocompromised state.   Neurological:  Negative for dizziness, tremors, seizures, syncope, facial asymmetry, speech difficulty, weakness, light-headedness, numbness and headaches.   Hematological:  Negative for adenopathy. Does not bruise/bleed easily.   Psychiatric/Behavioral:  Negative for agitation, behavioral problems, confusion, decreased concentration, dysphoric mood, hallucinations, self-injury, sleep disturbance and suicidal ideas. The patient is not nervous/anxious and is not hyperactive.         Objective      Physical Exam  There were no vitals taken for this visit.    There is no height or weight on file to calculate BMI.         General:   Mental Status:  Alert   Appearance: Cooperative, in no acute distress   Build and Nutrition: Well-nourished well-developed female   Orientation: Alert and oriented to person, place and time   Posture: Normal   Gait: Nonantalgic    Integument:   Left knee: Wound is  well-healed with no signs of infection    Lower Extremities:   Left Knee:    Tenderness:  None    Effusion:  Trace    Swelling: None    Crepitus:  None    Range of motion:  Extension: 5°       Flexion: 115°  Instability:  No varus laxity, no valgus laxity, negative anterior drawer  Deformities:  None      Imaging/Studies  Imaging Results (Last 24 Hours)       ** No results found for the last 24 hours. **          No new imaging today.    Assessment and Plan     Diagnoses and all orders for this visit:    1. Status post total left knee replacement (Primary)    2. Orthopedic aftercare    Other orders  -     traMADol (ULTRAM) 50 MG tablet; Take 1 tablet by mouth Every 12 (Twelve) Hours As Needed for Moderate Pain.  Dispense: 25 tablet; Refill: 0  -     meloxicam (MOBIC) 15 MG tablet; Take 1 tablet by mouth Daily for 30 days.  Dispense: 30 tablet; Refill: 0        1. Status post total left knee replacement    2. Orthopedic aftercare        I reviewed my findings with the patient.  Her left total knee arthroplasty appears to be functioning well, and she is going to continue to work on her range of motion and I will see her back in 10 months, which will be a 1 year checkup with x-rays.  I will see her back sooner for any problems.  I did provide a limited prescription of tramadol to be used intermittently requested by her today, as well as a prescription for meloxicam.  I do not anticipate refills.    Return in about 10 months (around 5/29/2025) for recheck with x-rays.      Blake Bradley MD  07/29/24  16:21 EDT    Dictated Utilizing Dragon Dictation

## 2024-08-05 ENCOUNTER — TELEPHONE (OUTPATIENT)
Dept: ORTHOPEDIC SURGERY | Facility: CLINIC | Age: 64
End: 2024-08-05
Payer: COMMERCIAL

## 2024-08-05 RX ORDER — MELOXICAM 15 MG/1
15 TABLET ORAL DAILY
Qty: 30 TABLET | Refills: 0 | Status: SHIPPED | OUTPATIENT
Start: 2024-08-05 | End: 2024-09-04

## 2024-08-05 NOTE — TELEPHONE ENCOUNTER
I spoke with the patient and let her know that the medication has been sent in to her pharmacy. She had no further questions.    Esperanza MATTA) ROT

## 2024-08-05 NOTE — TELEPHONE ENCOUNTER
PATIENT SAW DR. MEJIAS LAST WEEK FOR B/L KNEES. HE REFILLED SOME MEDS FOR HER, BUT DID NOT REFILL HER MELOXICAM. SHE IS WANTING TO KNOW IF HE CAN PUT THAT IN FOR HER?     CALL BACK # [phone]    [facility] [phone] - [place]?

## 2024-09-11 ENCOUNTER — TELEPHONE (OUTPATIENT)
Dept: ORTHOPEDIC SURGERY | Facility: CLINIC | Age: 64
End: 2024-09-11
Payer: COMMERCIAL

## 2024-09-11 NOTE — TELEPHONE ENCOUNTER
"Nette-please see message below and advise for this Dr. Bradley pt. She is s/p left TKA 5/21/24. Thanks!    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 Isn't sure what can be done to relieve these symptoms.    I explained that I would send the message back to our providers but that they may recommend she contact her PCP to discuss medication for the \"restless leg syndrome\" but I would get back with her to let her know if we had any other suggestions. She understood.    Keli KAUFFMAN CMA ([facility]), ROT    "

## 2024-09-11 NOTE — TELEPHONE ENCOUNTER
Patient is calling stated she has started experiencing restless leg in left leg (the leg she had surgery on)  -  started about a month ago.     She would like someone to return her call to discuss this.

## 2024-09-11 NOTE — TELEPHONE ENCOUNTER
Spoke to pt to let her know Nette's message; She verbalized understanding.    Keli KAUFFMAN CMA ([facility]), ROT

## 2024-09-19 ENCOUNTER — TELEPHONE (OUTPATIENT)
Dept: ORTHOPEDIC SURGERY | Facility: CLINIC | Age: 64
End: 2024-09-19
Payer: COMMERCIAL

## 2024-11-07 ENCOUNTER — TRANSCRIBE ORDERS (OUTPATIENT)
Dept: ADMINISTRATIVE | Facility: HOSPITAL | Age: 64
End: 2024-11-07
Payer: COMMERCIAL

## 2024-11-07 DIAGNOSIS — Z12.31 VISIT FOR SCREENING MAMMOGRAM: Primary | ICD-10-CM

## 2025-02-26 ENCOUNTER — HOSPITAL ENCOUNTER (OUTPATIENT)
Dept: MAMMOGRAPHY | Facility: HOSPITAL | Age: 65
Discharge: HOME | End: 2025-02-26
Admitting: INTERNAL MEDICINE
Payer: COMMERCIAL

## 2025-02-26 DIAGNOSIS — Z12.31 VISIT FOR SCREENING MAMMOGRAM: ICD-10-CM

## 2025-02-26 PROCEDURE — 77063 BREAST TOMOSYNTHESIS BI: CPT

## 2025-02-26 PROCEDURE — 77067 SCR MAMMO BI INCL CAD: CPT

## (undated) DEVICE — ANTIBACTERIAL UNDYED BRAIDED (POLYGLACTIN 910), SYNTHETIC ABSORBABLE SUTURE: Brand: COATED VICRYL

## (undated) DEVICE — PATIENT RETURN ELECTRODE, SINGLE-USE, CONTACT QUALITY MONITORING, ADULT, WITH 9FT CORD, FOR PATIENTS WEIGING OVER 33LBS. (15KG): Brand: MEGADYNE

## (undated) DEVICE — PENCL SMOKE/EVAC MEGADYNE TELESCP 10FT

## (undated) DEVICE — SYS CLS SKIN PREMIERPRO EXOFINFUSION 22CM

## (undated) DEVICE — KT PUMP INFUBLOCK MDL 2100 PMKITSOLIS

## (undated) DEVICE — STRYKER PERFORMANCE SERIES SAGITTAL BLADE: Brand: STRYKER PERFORMANCE SERIES

## (undated) DEVICE — SHEET,DRAPE,40X58,STERILE: Brand: MEDLINE

## (undated) DEVICE — ADHS SKIN PREMIERPRO EXOFIN TOPICAL HI/VISC .5ML

## (undated) DEVICE — PK KN TOTL 10

## (undated) DEVICE — CEMENT MIXING SYSTEM WITH MIS FEMORAL BREAKAWAY NOZZLE: Brand: REVOLUTION

## (undated) DEVICE — Device

## (undated) DEVICE — PAD,ARMBOARD,CONV,FOAM,2X8X20",12PR/CS: Brand: MEDLINE

## (undated) DEVICE — GLV SURG PREMIERPRO MIC LTX PF SZ8.5 BRN

## (undated) DEVICE — GLOVE,SURG,SENSICARE,ALOE,LF,PF,9: Brand: MEDLINE

## (undated) DEVICE — SUT MONOCRYL PLS ANTIB UND 3/0  PS1 27IN

## (undated) DEVICE — NEEDLE,HYPODERM,SAFETY,18GX1.5: Brand: MEDLINE

## (undated) DEVICE — BLANKT WARM UPPR/BDY ARM/OUT 57X196CM

## (undated) DEVICE — UNDERCAST PADDING: Brand: DEROYAL

## (undated) DEVICE — TRY EPID SFTY 18G 3.5IN 1T7680

## (undated) DEVICE — CVR FTSWITCH UNIV

## (undated) DEVICE — TRAP FLD MINIVAC MEGADYNE 100ML

## (undated) DEVICE — BNDG ELAS W/CLIP 6IN 10YD LF STRL

## (undated) DEVICE — DRSNG PAD ABD 8X10IN STRL